# Patient Record
Sex: MALE | Race: WHITE | Employment: UNEMPLOYED | URBAN - METROPOLITAN AREA
[De-identification: names, ages, dates, MRNs, and addresses within clinical notes are randomized per-mention and may not be internally consistent; named-entity substitution may affect disease eponyms.]

---

## 2017-04-28 ENCOUNTER — TELEPHONE (OUTPATIENT)
Dept: INTERNAL MEDICINE CLINIC | Age: 27
End: 2017-04-28

## 2017-05-08 ENCOUNTER — OFFICE VISIT (OUTPATIENT)
Dept: INTERNAL MEDICINE CLINIC | Age: 27
End: 2017-05-08

## 2017-05-08 VITALS
HEART RATE: 64 BPM | BODY MASS INDEX: 24.4 KG/M2 | OXYGEN SATURATION: 98 % | HEIGHT: 66 IN | WEIGHT: 151.8 LBS | SYSTOLIC BLOOD PRESSURE: 118 MMHG | DIASTOLIC BLOOD PRESSURE: 78 MMHG

## 2017-05-08 DIAGNOSIS — F11.90 HEROIN USE: Primary | ICD-10-CM

## 2017-05-08 DIAGNOSIS — F41.9 ANXIETY: ICD-10-CM

## 2017-05-08 PROCEDURE — 99214 OFFICE O/P EST MOD 30 MIN: CPT | Performed by: NURSE PRACTITIONER

## 2017-05-15 ENCOUNTER — TELEPHONE (OUTPATIENT)
Dept: INTERNAL MEDICINE CLINIC | Age: 27
End: 2017-05-15

## 2018-11-11 ENCOUNTER — HOSPITAL ENCOUNTER (EMERGENCY)
Age: 28
Discharge: HOME | End: 2018-11-11
Attending: EMERGENCY MEDICINE
Payer: MEDICAID

## 2018-11-11 ENCOUNTER — APPOINTMENT (OUTPATIENT)
Dept: CT IMAGING | Age: 28
End: 2018-11-11
Payer: MEDICAID

## 2018-11-11 VITALS
OXYGEN SATURATION: 95 % | HEART RATE: 96 BPM | DIASTOLIC BLOOD PRESSURE: 79 MMHG | SYSTOLIC BLOOD PRESSURE: 120 MMHG | WEIGHT: 151 LBS | RESPIRATION RATE: 14 BRPM | TEMPERATURE: 98 F | BODY MASS INDEX: 24.37 KG/M2

## 2018-11-11 DIAGNOSIS — R07.81 RIB PAIN: ICD-10-CM

## 2018-11-11 DIAGNOSIS — J18.9 PNEUMONIA DUE TO ORGANISM: Primary | ICD-10-CM

## 2018-11-11 LAB
ALBUMIN SERPL-MCNC: 3.9 G/DL (ref 3.4–5)
ALP BLD-CCNC: 64 U/L (ref 40–129)
ALT SERPL-CCNC: 139 U/L (ref 10–40)
ANION GAP SERPL CALCULATED.3IONS-SCNC: 12 MMOL/L (ref 3–16)
AST SERPL-CCNC: 86 U/L (ref 15–37)
BASOPHILS ABSOLUTE: 0.1 K/UL (ref 0–0.2)
BASOPHILS RELATIVE PERCENT: 0.6 %
BILIRUB SERPL-MCNC: 0.6 MG/DL (ref 0–1)
BILIRUBIN DIRECT: <0.2 MG/DL (ref 0–0.3)
BILIRUBIN, INDIRECT: ABNORMAL MG/DL (ref 0–1)
BUN BLDV-MCNC: 6 MG/DL (ref 7–20)
CALCIUM SERPL-MCNC: 9.3 MG/DL (ref 8.3–10.6)
CHLORIDE BLD-SCNC: 94 MMOL/L (ref 99–110)
CO2: 28 MMOL/L (ref 21–32)
CREAT SERPL-MCNC: 0.6 MG/DL (ref 0.9–1.3)
EOSINOPHILS ABSOLUTE: 0 K/UL (ref 0–0.6)
EOSINOPHILS RELATIVE PERCENT: 0 %
GFR AFRICAN AMERICAN: >60
GFR NON-AFRICAN AMERICAN: >60
GLUCOSE BLD-MCNC: 216 MG/DL (ref 70–99)
HCT VFR BLD CALC: 37.1 % (ref 40.5–52.5)
HEMOGLOBIN: 12.6 G/DL (ref 13.5–17.5)
INR BLD: 1.46 (ref 0.86–1.14)
LYMPHOCYTES ABSOLUTE: 1.5 K/UL (ref 1–5.1)
LYMPHOCYTES RELATIVE PERCENT: 10.8 %
MCH RBC QN AUTO: 29.8 PG (ref 26–34)
MCHC RBC AUTO-ENTMCNC: 34.1 G/DL (ref 31–36)
MCV RBC AUTO: 87.4 FL (ref 80–100)
MONOCYTES ABSOLUTE: 0.9 K/UL (ref 0–1.3)
MONOCYTES RELATIVE PERCENT: 6.5 %
NEUTROPHILS ABSOLUTE: 11.5 K/UL (ref 1.7–7.7)
NEUTROPHILS RELATIVE PERCENT: 82.1 %
PDW BLD-RTO: 15 % (ref 12.4–15.4)
PLATELET # BLD: 200 K/UL (ref 135–450)
PMV BLD AUTO: 8.2 FL (ref 5–10.5)
POTASSIUM SERPL-SCNC: 3.6 MMOL/L (ref 3.5–5.1)
PROTHROMBIN TIME: 16.6 SEC (ref 9.8–13)
RBC # BLD: 4.24 M/UL (ref 4.2–5.9)
SODIUM BLD-SCNC: 134 MMOL/L (ref 136–145)
TOTAL PROTEIN: 7.2 G/DL (ref 6.4–8.2)
WBC # BLD: 14.1 K/UL (ref 4–11)

## 2018-11-11 PROCEDURE — 85025 COMPLETE CBC W/AUTO DIFF WBC: CPT

## 2018-11-11 PROCEDURE — 2580000003 HC RX 258: Performed by: EMERGENCY MEDICINE

## 2018-11-11 PROCEDURE — 6370000000 HC RX 637 (ALT 250 FOR IP): Performed by: EMERGENCY MEDICINE

## 2018-11-11 PROCEDURE — 99284 EMERGENCY DEPT VISIT MOD MDM: CPT

## 2018-11-11 PROCEDURE — 96374 THER/PROPH/DIAG INJ IV PUSH: CPT

## 2018-11-11 PROCEDURE — 85610 PROTHROMBIN TIME: CPT

## 2018-11-11 PROCEDURE — 74177 CT ABD & PELVIS W/CONTRAST: CPT

## 2018-11-11 PROCEDURE — 96361 HYDRATE IV INFUSION ADD-ON: CPT

## 2018-11-11 PROCEDURE — 6360000004 HC RX CONTRAST MEDICATION: Performed by: EMERGENCY MEDICINE

## 2018-11-11 PROCEDURE — 71260 CT THORAX DX C+: CPT

## 2018-11-11 PROCEDURE — 80048 BASIC METABOLIC PNL TOTAL CA: CPT

## 2018-11-11 PROCEDURE — 80076 HEPATIC FUNCTION PANEL: CPT

## 2018-11-11 PROCEDURE — 6360000002 HC RX W HCPCS: Performed by: EMERGENCY MEDICINE

## 2018-11-11 RX ORDER — 0.9 % SODIUM CHLORIDE 0.9 %
1000 INTRAVENOUS SOLUTION INTRAVENOUS ONCE
Status: COMPLETED | OUTPATIENT
Start: 2018-11-11 | End: 2018-11-11

## 2018-11-11 RX ORDER — DOXYCYCLINE 100 MG/1
100 TABLET ORAL 2 TIMES DAILY
Qty: 14 TABLET | Refills: 0 | Status: SHIPPED | OUTPATIENT
Start: 2018-11-11 | End: 2018-11-18

## 2018-11-11 RX ORDER — DOXYCYCLINE 100 MG/1
100 CAPSULE ORAL ONCE
Status: COMPLETED | OUTPATIENT
Start: 2018-11-11 | End: 2018-11-11

## 2018-11-11 RX ADMIN — SODIUM CHLORIDE 1000 ML: 9 INJECTION, SOLUTION INTRAVENOUS at 16:36

## 2018-11-11 RX ADMIN — HYDROMORPHONE HYDROCHLORIDE 0.5 MG: 1 INJECTION, SOLUTION INTRAMUSCULAR; INTRAVENOUS; SUBCUTANEOUS at 16:36

## 2018-11-11 RX ADMIN — IOPAMIDOL 80 ML: 755 INJECTION, SOLUTION INTRAVENOUS at 16:59

## 2018-11-11 RX ADMIN — DOXYCYCLINE 100 MG: 100 CAPSULE ORAL at 18:36

## 2018-11-11 ASSESSMENT — PAIN DESCRIPTION - LOCATION: LOCATION: RIB CAGE

## 2018-11-11 ASSESSMENT — PAIN SCALES - GENERAL: PAINLEVEL_OUTOF10: 10

## 2018-11-11 ASSESSMENT — PAIN DESCRIPTION - ORIENTATION: ORIENTATION: LEFT

## 2018-11-11 NOTE — ED PROVIDER NOTES
portal venous system. No adenopathy, mesenteric mass, ascites or free air. No abnormal bowel dilatation or wall thickening. Normal unopacified bladder. Normal size prostate. No fracture or dislocation. Benign bone islands proximal right femur and supra-acetabular left ilium. IMPRESSION:       No acute abnormality, mass or abnormal fluid collection. 10 mm small hypodense focus in left lobe of liver region of fissure of ligamentum teres consistent with a benign finding. Suggest follow-up CT abdomen with IV contrast in one year for confirmation of stability. LABS:   Labs Reviewed   CBC WITH AUTO DIFFERENTIAL - Abnormal; Notable for the following:        Result Value    WBC 14.1 (*)     Hemoglobin 12.6 (*)     Hematocrit 37.1 (*)     Neutrophils # 11.5 (*)     All other components within normal limits    Narrative:     Performed at: [facility]  [address],  [place], [address]   Phone [phone]   BASIC METABOLIC PANEL - Abnormal; Notable for the following:     Sodium 134 (*)     Chloride 94 (*)     Glucose 216 (*)     BUN 6 (*)     CREATININE 0.6 (*)     All other components within normal limits    Narrative:     Performed at: [facility]  [address],  [place], [address]   Phone [phone]   HEPATIC FUNCTION PANEL - Abnormal; Notable for the following:      (*)     AST 86 (*)     All other components within normal limits    Narrative:     Performed at: [facility]  [address],  [place], [address]   Phone [phone]   PROTIME-INR - Abnormal; Notable for the following:     Protime 16.6 (*)     INR 1.46 (*)     All other components within normal limits    Narrative:     Performed at:   [facility]  [address],  [place], [address]   Phone [phone]

## 2018-11-11 NOTE — ED PROVIDER NOTES
catch breath, or vomiting  Patient understands plan      Diagnostic Results       RADIOLOGY:  CT CHEST W CONTRAST   Final Result      Atelectasis and/or pneumonia anterior-inferior lingula and posterior inferior left lower lobe. Minimal left pleural effusion. No pneumothorax. Chronic fractures left eighth, ninth and 10th ribs. CT ABDOMEN PELVIS WITH CONTRAST      HISTORY: 78-year-old male with left chest trauma      Images from dome of liver through pubic symphysis without oral contrast and with the IV contrast from CT chest. No prior CTs for comparison. Radiation dose reduction individualized and optimized for the CT scan to reduce the radiation exposure to as low as reasonably achievable (ALARA), while still obtaining diagnotic-quality images. The dose reduction techniques include automated exposure    control, adjustment of mA and/or kV according to patient size and use of iterative reconstruction technique. 7 mm x 10 mm small mildly hypodense focus higher than fat density left lobe of liver in the region of fissure of ligamentum teres (axial series 2 images 105-106). Remainder of liver, spleen and pancreas normal.      Small contracted gallbladder. Normal caliber biliary tract. Normal kidneys and adrenals. No hydroureteronephrosis. Normal abdominal aorta and iliac arteries. Normal opacification of portal venous system. No adenopathy, mesenteric mass, ascites or free air. No abnormal bowel dilatation or wall thickening. Normal unopacified bladder. Normal size prostate. No fracture or dislocation. Benign bone islands proximal right femur and supra-acetabular left ilium. IMPRESSION:       No acute abnormality, mass or abnormal fluid collection. 10 mm small hypodense focus in left lobe of liver region of fissure of ligamentum teres consistent with a benign finding.    Suggest follow-up CT abdomen with IV contrast in one year for confirmation of stability. CT ABDOMEN PELVIS W IV CONTRAST Additional Contrast? None   Final Result      Atelectasis and/or pneumonia anterior-inferior lingula and posterior inferior left lower lobe. Minimal left pleural effusion. No pneumothorax. Chronic fractures left eighth, ninth and 10th ribs. CT ABDOMEN PELVIS WITH CONTRAST      HISTORY: 57-year-old male with left chest trauma      Images from dome of liver through pubic symphysis without oral contrast and with the IV contrast from CT chest. No prior CTs for comparison. Radiation dose reduction individualized and optimized for the CT scan to reduce the radiation exposure to as low as reasonably achievable (ALARA), while still obtaining diagnotic-quality images. The dose reduction techniques include automated exposure    control, adjustment of mA and/or kV according to patient size and use of iterative reconstruction technique. 7 mm x 10 mm small mildly hypodense focus higher than fat density left lobe of liver in the region of fissure of ligamentum teres (axial series 2 images 105-106). Remainder of liver, spleen and pancreas normal.      Small contracted gallbladder. Normal caliber biliary tract. Normal kidneys and adrenals. No hydroureteronephrosis. Normal abdominal aorta and iliac arteries. Normal opacification of portal venous system. No adenopathy, mesenteric mass, ascites or free air. No abnormal bowel dilatation or wall thickening. Normal unopacified bladder. Normal size prostate. No fracture or dislocation. Benign bone islands proximal right femur and supra-acetabular left ilium. IMPRESSION:       No acute abnormality, mass or abnormal fluid collection. 10 mm small hypodense focus in left lobe of liver region of fissure of ligamentum teres consistent with a benign finding. Suggest follow-up CT abdomen with IV contrast in one year for confirmation of stability.              LABS:   Labs

## 2019-02-25 ENCOUNTER — APPOINTMENT (OUTPATIENT)
Dept: GENERAL RADIOLOGY | Age: 29
End: 2019-02-25
Payer: MEDICAID

## 2019-02-25 ENCOUNTER — HOSPITAL ENCOUNTER (EMERGENCY)
Age: 29
Discharge: HOME | End: 2019-02-26
Attending: EMERGENCY MEDICINE
Payer: MEDICAID

## 2019-02-25 DIAGNOSIS — T40.1X4A DIACETYLMORPHINE OVERDOSE, UNDETERMINED INTENT, INITIAL ENCOUNTER: Primary | ICD-10-CM

## 2019-02-25 PROCEDURE — 71046 X-RAY EXAM CHEST 2 VIEWS: CPT

## 2019-02-25 PROCEDURE — 99284 EMERGENCY DEPT VISIT MOD MDM: CPT

## 2019-02-26 VITALS
TEMPERATURE: 97.7 F | SYSTOLIC BLOOD PRESSURE: 130 MMHG | OXYGEN SATURATION: 98 % | DIASTOLIC BLOOD PRESSURE: 87 MMHG | HEART RATE: 89 BPM

## 2019-08-18 ENCOUNTER — APPOINTMENT (OUTPATIENT)
Dept: CT IMAGING | Age: 29
DRG: 812 | End: 2019-08-18
Payer: MEDICAID

## 2019-08-18 ENCOUNTER — APPOINTMENT (OUTPATIENT)
Dept: GENERAL RADIOLOGY | Age: 29
DRG: 812 | End: 2019-08-18
Payer: MEDICAID

## 2019-08-18 ENCOUNTER — HOSPITAL ENCOUNTER (INPATIENT)
Age: 29
LOS: 2 days | Discharge: HOME | DRG: 812 | End: 2019-08-20
Attending: EMERGENCY MEDICINE | Admitting: INTERNAL MEDICINE
Payer: MEDICAID

## 2019-08-18 DIAGNOSIS — T50.901A ACCIDENTAL DRUG OVERDOSE, INITIAL ENCOUNTER: Primary | ICD-10-CM

## 2019-08-18 DIAGNOSIS — N17.9 AKI (ACUTE KIDNEY INJURY) (HCC): ICD-10-CM

## 2019-08-18 DIAGNOSIS — F19.921 ACUTE DRUG INTOXICATION WITH DELIRIUM (HCC): ICD-10-CM

## 2019-08-18 LAB
A/G RATIO: 1.4 (ref 1.1–2.2)
ALBUMIN SERPL-MCNC: 5.3 G/DL (ref 3.4–5)
ALP BLD-CCNC: 59 U/L (ref 40–129)
ALT SERPL-CCNC: 47 U/L (ref 10–40)
AMORPHOUS: ABNORMAL /HPF
AMPHETAMINE SCREEN, URINE: POSITIVE
ANION GAP SERPL CALCULATED.3IONS-SCNC: 22 MMOL/L (ref 3–16)
AST SERPL-CCNC: 113 U/L (ref 15–37)
BACTERIA: ABNORMAL /HPF
BANDED NEUTROPHILS RELATIVE PERCENT: 7 % (ref 0–7)
BARBITURATE SCREEN URINE: ABNORMAL
BASOPHILS ABSOLUTE: 0 K/UL (ref 0–0.2)
BASOPHILS RELATIVE PERCENT: 0 %
BENZODIAZEPINE SCREEN, URINE: ABNORMAL
BILIRUB SERPL-MCNC: 1.1 MG/DL (ref 0–1)
BILIRUBIN URINE: ABNORMAL
BLOOD, URINE: ABNORMAL
BUN BLDV-MCNC: 30 MG/DL (ref 7–20)
CALCIUM SERPL-MCNC: 10 MG/DL (ref 8.3–10.6)
CANNABINOID SCREEN URINE: POSITIVE
CASTS 2: ABNORMAL /LPF
CASTS: ABNORMAL /LPF
CHLORIDE BLD-SCNC: 100 MMOL/L (ref 99–110)
CLARITY: CLEAR
CO2: 20 MMOL/L (ref 21–32)
COCAINE METABOLITE SCREEN URINE: POSITIVE
COLOR: YELLOW
CREAT SERPL-MCNC: 1.8 MG/DL (ref 0.9–1.3)
EOSINOPHILS ABSOLUTE: 0.2 K/UL (ref 0–0.6)
EOSINOPHILS RELATIVE PERCENT: 1 %
EPITHELIAL CELLS, UA: ABNORMAL /HPF
ETHANOL: NORMAL MG/DL (ref 0–0.08)
GFR AFRICAN AMERICAN: 54
GFR NON-AFRICAN AMERICAN: 45
GLOBULIN: 3.7 G/DL
GLUCOSE BLD-MCNC: 124 MG/DL (ref 70–99)
GLUCOSE URINE: NEGATIVE MG/DL
HCT VFR BLD CALC: 42.5 % (ref 40.5–52.5)
HEMATOLOGY PATH CONSULT: YES
HEMOGLOBIN: 14.4 G/DL (ref 13.5–17.5)
KETONES, URINE: 15 MG/DL
LACTIC ACID: 1.1 MMOL/L (ref 0.4–2)
LEUKOCYTE ESTERASE, URINE: NEGATIVE
LYMPHOCYTES ABSOLUTE: 3.7 K/UL (ref 1–5.1)
LYMPHOCYTES RELATIVE PERCENT: 18 %
Lab: ABNORMAL
MAGNESIUM: 1.8 MG/DL (ref 1.8–2.4)
MCH RBC QN AUTO: 29.9 PG (ref 26–34)
MCHC RBC AUTO-ENTMCNC: 34 G/DL (ref 31–36)
MCV RBC AUTO: 88.1 FL (ref 80–100)
METHADONE SCREEN, URINE: ABNORMAL
MICROSCOPIC EXAMINATION: YES
MONOCYTES ABSOLUTE: 2.2 K/UL (ref 0–1.3)
MONOCYTES RELATIVE PERCENT: 11 %
NEUTROPHILS ABSOLUTE: 14.2 K/UL (ref 1.7–7.7)
NEUTROPHILS RELATIVE PERCENT: 63 %
NITRITE, URINE: NEGATIVE
OPIATE SCREEN URINE: POSITIVE
OXYCODONE URINE: ABNORMAL
PDW BLD-RTO: 12.7 % (ref 12.4–15.4)
PH UA: 5.5
PH UA: 5.5 (ref 5–8)
PHENCYCLIDINE SCREEN URINE: ABNORMAL
PLATELET # BLD: 287 K/UL (ref 135–450)
PMV BLD AUTO: 8.9 FL (ref 5–10.5)
POTASSIUM REFLEX MAGNESIUM: 3.5 MMOL/L (ref 3.5–5.1)
PROPOXYPHENE SCREEN: ABNORMAL
PROTEIN UA: 100 MG/DL
RBC # BLD: 4.82 M/UL (ref 4.2–5.9)
RBC UA: ABNORMAL /HPF (ref 0–2)
SODIUM BLD-SCNC: 142 MMOL/L (ref 136–145)
SPECIFIC GRAVITY UA: >=1.03 (ref 1–1.03)
TOTAL CK: 1669 U/L (ref 39–308)
TOTAL PROTEIN: 9 G/DL (ref 6.4–8.2)
TROPONIN: <0.01 NG/ML
URINE TYPE: ABNORMAL
UROBILINOGEN, URINE: 1 E.U./DL
WBC # BLD: 20.3 K/UL (ref 4–11)
WBC UA: ABNORMAL /HPF (ref 0–5)

## 2019-08-18 PROCEDURE — 96372 THER/PROPH/DIAG INJ SC/IM: CPT

## 2019-08-18 PROCEDURE — 81001 URINALYSIS AUTO W/SCOPE: CPT

## 2019-08-18 PROCEDURE — 99291 CRITICAL CARE FIRST HOUR: CPT

## 2019-08-18 PROCEDURE — 84484 ASSAY OF TROPONIN QUANT: CPT

## 2019-08-18 PROCEDURE — 71045 X-RAY EXAM CHEST 1 VIEW: CPT

## 2019-08-18 PROCEDURE — G0480 DRUG TEST DEF 1-7 CLASSES: HCPCS

## 2019-08-18 PROCEDURE — 82550 ASSAY OF CK (CPK): CPT

## 2019-08-18 PROCEDURE — 96361 HYDRATE IV INFUSION ADD-ON: CPT

## 2019-08-18 PROCEDURE — 83735 ASSAY OF MAGNESIUM: CPT

## 2019-08-18 PROCEDURE — 70450 CT HEAD/BRAIN W/O DYE: CPT

## 2019-08-18 PROCEDURE — 36415 COLL VENOUS BLD VENIPUNCTURE: CPT

## 2019-08-18 PROCEDURE — 6360000002 HC RX W HCPCS: Performed by: EMERGENCY MEDICINE

## 2019-08-18 PROCEDURE — 96375 TX/PRO/DX INJ NEW DRUG ADDON: CPT

## 2019-08-18 PROCEDURE — 85025 COMPLETE CBC W/AUTO DIFF WBC: CPT

## 2019-08-18 PROCEDURE — 2500000003 HC RX 250 WO HCPCS: Performed by: EMERGENCY MEDICINE

## 2019-08-18 PROCEDURE — 83605 ASSAY OF LACTIC ACID: CPT

## 2019-08-18 PROCEDURE — 80307 DRUG TEST PRSMV CHEM ANLYZR: CPT

## 2019-08-18 PROCEDURE — 2000000000 HC ICU R&B

## 2019-08-18 PROCEDURE — 2580000003 HC RX 258: Performed by: EMERGENCY MEDICINE

## 2019-08-18 PROCEDURE — 51702 INSERT TEMP BLADDER CATH: CPT

## 2019-08-18 PROCEDURE — 93005 ELECTROCARDIOGRAM TRACING: CPT | Performed by: EMERGENCY MEDICINE

## 2019-08-18 PROCEDURE — 80053 COMPREHEN METABOLIC PANEL: CPT

## 2019-08-18 RX ORDER — HALOPERIDOL 5 MG
5 TABLET ORAL ONCE
Status: DISCONTINUED | OUTPATIENT
Start: 2019-08-18 | End: 2019-08-18

## 2019-08-18 RX ORDER — KETAMINE HYDROCHLORIDE 50 MG/ML
400 INJECTION, SOLUTION, CONCENTRATE INTRAMUSCULAR; INTRAVENOUS ONCE
Status: COMPLETED | OUTPATIENT
Start: 2019-08-18 | End: 2019-08-18

## 2019-08-18 RX ORDER — 0.9 % SODIUM CHLORIDE 0.9 %
1000 INTRAVENOUS SOLUTION INTRAVENOUS ONCE
Status: COMPLETED | OUTPATIENT
Start: 2019-08-18 | End: 2019-08-18

## 2019-08-18 RX ORDER — KETAMINE HYDROCHLORIDE 50 MG/ML
100 INJECTION, SOLUTION, CONCENTRATE INTRAMUSCULAR; INTRAVENOUS ONCE
Status: COMPLETED | OUTPATIENT
Start: 2019-08-18 | End: 2019-08-19

## 2019-08-18 RX ORDER — DIPHENHYDRAMINE HYDROCHLORIDE 50 MG/ML
50 INJECTION INTRAMUSCULAR; INTRAVENOUS ONCE
Status: COMPLETED | OUTPATIENT
Start: 2019-08-18 | End: 2019-08-18

## 2019-08-18 RX ORDER — LORAZEPAM 2 MG/ML
2 INJECTION INTRAMUSCULAR ONCE
Status: COMPLETED | OUTPATIENT
Start: 2019-08-18 | End: 2019-08-18

## 2019-08-18 RX ORDER — HALOPERIDOL 5 MG/ML
5 INJECTION INTRAMUSCULAR ONCE
Status: COMPLETED | OUTPATIENT
Start: 2019-08-18 | End: 2019-08-18

## 2019-08-18 RX ORDER — SODIUM CHLORIDE, SODIUM LACTATE, POTASSIUM CHLORIDE, CALCIUM CHLORIDE 600; 310; 30; 20 MG/100ML; MG/100ML; MG/100ML; MG/100ML
1000 INJECTION, SOLUTION INTRAVENOUS CONTINUOUS
Status: DISCONTINUED | OUTPATIENT
Start: 2019-08-18 | End: 2019-08-19

## 2019-08-18 RX ADMIN — KETAMINE HYDROCHLORIDE 400 MG: 50 INJECTION, SOLUTION INTRAMUSCULAR; INTRAVENOUS at 21:37

## 2019-08-18 RX ADMIN — HALOPERIDOL LACTATE 5 MG: 5 INJECTION, SOLUTION INTRAMUSCULAR at 20:53

## 2019-08-18 RX ADMIN — LORAZEPAM 2 MG: 2 INJECTION INTRAMUSCULAR; INTRAVENOUS at 20:49

## 2019-08-18 RX ADMIN — DIPHENHYDRAMINE HYDROCHLORIDE 50 MG: 50 INJECTION, SOLUTION INTRAMUSCULAR; INTRAVENOUS at 20:49

## 2019-08-18 RX ADMIN — SODIUM CHLORIDE, SODIUM LACTATE, POTASSIUM CHLORIDE, AND CALCIUM CHLORIDE 1000 ML: .6; .31; .03; .02 INJECTION, SOLUTION INTRAVENOUS at 22:48

## 2019-08-18 RX ADMIN — SODIUM CHLORIDE 1000 ML: 9 INJECTION, SOLUTION INTRAVENOUS at 20:50

## 2019-08-19 ENCOUNTER — APPOINTMENT (OUTPATIENT)
Dept: GENERAL RADIOLOGY | Age: 29
DRG: 812 | End: 2019-08-19
Payer: MEDICAID

## 2019-08-19 LAB
ANION GAP SERPL CALCULATED.3IONS-SCNC: 14 MMOL/L (ref 3–16)
BASE EXCESS ARTERIAL: -1.9 MMOL/L (ref -3–3)
BUN BLDV-MCNC: 24 MG/DL (ref 7–20)
CALCIUM SERPL-MCNC: 8.7 MG/DL (ref 8.3–10.6)
CARBOXYHEMOGLOBIN ARTERIAL: 1.1 % (ref 0–1.5)
CHLORIDE BLD-SCNC: 103 MMOL/L (ref 99–110)
CO2: 21 MMOL/L (ref 21–32)
CREAT SERPL-MCNC: 1.1 MG/DL (ref 0.9–1.3)
EKG ATRIAL RATE: 92 BPM
EKG DIAGNOSIS: NORMAL
EKG P AXIS: 44 DEGREES
EKG P-R INTERVAL: 138 MS
EKG Q-T INTERVAL: 358 MS
EKG QRS DURATION: 96 MS
EKG QTC CALCULATION (BAZETT): 442 MS
EKG R AXIS: 2 DEGREES
EKG T AXIS: 28 DEGREES
EKG VENTRICULAR RATE: 92 BPM
GFR AFRICAN AMERICAN: >60
GFR NON-AFRICAN AMERICAN: >60
GLUCOSE BLD-MCNC: 97 MG/DL (ref 70–99)
HCO3 ARTERIAL: 23 MMOL/L (ref 21–29)
HCT VFR BLD CALC: 34.9 % (ref 40.5–52.5)
HEMATOLOGY PATH CONSULT: NORMAL
HEMOGLOBIN, ART, EXTENDED: 12 G/DL (ref 13.5–17.5)
HEMOGLOBIN: 12.2 G/DL (ref 13.5–17.5)
LACTIC ACID: 0.7 MMOL/L (ref 0.4–2)
LACTIC ACID: 0.9 MMOL/L (ref 0.4–2)
LACTIC ACID: 1.6 MMOL/L (ref 0.4–2)
MAGNESIUM: 2.2 MG/DL (ref 1.8–2.4)
MCH RBC QN AUTO: 30.8 PG (ref 26–34)
MCHC RBC AUTO-ENTMCNC: 34.9 G/DL (ref 31–36)
MCV RBC AUTO: 88.2 FL (ref 80–100)
METHEMOGLOBIN ARTERIAL: 0.9 %
O2 CONTENT ARTERIAL: 17 ML/DL
O2 SAT, ARTERIAL: 99.3 %
O2 THERAPY: ABNORMAL
PCO2 ARTERIAL: 42.5 MMHG (ref 35–45)
PDW BLD-RTO: 12.6 % (ref 12.4–15.4)
PH ARTERIAL: 7.35 (ref 7.35–7.45)
PHOSPHORUS: 3.2 MG/DL (ref 2.5–4.9)
PLATELET # BLD: 180 K/UL (ref 135–450)
PMV BLD AUTO: 8.5 FL (ref 5–10.5)
PO2 ARTERIAL: 202 MMHG (ref 75–108)
POTASSIUM SERPL-SCNC: 4.3 MMOL/L (ref 3.5–5.1)
PROCALCITONIN: 0.76 NG/ML (ref 0–0.15)
RBC # BLD: 3.96 M/UL (ref 4.2–5.9)
SODIUM BLD-SCNC: 138 MMOL/L (ref 136–145)
TCO2 ARTERIAL: 24.3 MMOL/L
TOTAL CK: 1711 U/L (ref 39–308)
WBC # BLD: 14.2 K/UL (ref 4–11)

## 2019-08-19 PROCEDURE — 84145 PROCALCITONIN (PCT): CPT

## 2019-08-19 PROCEDURE — 99255 IP/OBS CONSLTJ NEW/EST HI 80: CPT | Performed by: INTERNAL MEDICINE

## 2019-08-19 PROCEDURE — 6360000002 HC RX W HCPCS: Performed by: EMERGENCY MEDICINE

## 2019-08-19 PROCEDURE — 2580000003 HC RX 258: Performed by: INTERNAL MEDICINE

## 2019-08-19 PROCEDURE — 94002 VENT MGMT INPAT INIT DAY: CPT

## 2019-08-19 PROCEDURE — 83735 ASSAY OF MAGNESIUM: CPT

## 2019-08-19 PROCEDURE — 80048 BASIC METABOLIC PNL TOTAL CA: CPT

## 2019-08-19 PROCEDURE — 2500000003 HC RX 250 WO HCPCS: Performed by: EMERGENCY MEDICINE

## 2019-08-19 PROCEDURE — 6360000002 HC RX W HCPCS: Performed by: INTERNAL MEDICINE

## 2019-08-19 PROCEDURE — 94761 N-INVAS EAR/PLS OXIMETRY MLT: CPT

## 2019-08-19 PROCEDURE — 36415 COLL VENOUS BLD VENIPUNCTURE: CPT

## 2019-08-19 PROCEDURE — 71045 X-RAY EXAM CHEST 1 VIEW: CPT

## 2019-08-19 PROCEDURE — 93010 ELECTROCARDIOGRAM REPORT: CPT | Performed by: INTERNAL MEDICINE

## 2019-08-19 PROCEDURE — 36600 WITHDRAWAL OF ARTERIAL BLOOD: CPT

## 2019-08-19 PROCEDURE — APPNB15 APP NON BILLABLE TIME 0-15 MINS: Performed by: NURSE PRACTITIONER

## 2019-08-19 PROCEDURE — 84100 ASSAY OF PHOSPHORUS: CPT

## 2019-08-19 PROCEDURE — 85027 COMPLETE CBC AUTOMATED: CPT

## 2019-08-19 PROCEDURE — 94750 HC PULMONARY COMPLIANCE STUDY: CPT

## 2019-08-19 PROCEDURE — 82803 BLOOD GASES ANY COMBINATION: CPT

## 2019-08-19 PROCEDURE — 82550 ASSAY OF CK (CPK): CPT

## 2019-08-19 PROCEDURE — 96365 THER/PROPH/DIAG IV INF INIT: CPT

## 2019-08-19 PROCEDURE — 2700000000 HC OXYGEN THERAPY PER DAY

## 2019-08-19 PROCEDURE — 2000000000 HC ICU R&B

## 2019-08-19 PROCEDURE — 83605 ASSAY OF LACTIC ACID: CPT

## 2019-08-19 RX ORDER — ETOMIDATE 2 MG/ML
15 INJECTION INTRAVENOUS ONCE
Status: COMPLETED | OUTPATIENT
Start: 2019-08-19 | End: 2019-08-19

## 2019-08-19 RX ORDER — SODIUM CHLORIDE 0.9 % (FLUSH) 0.9 %
10 SYRINGE (ML) INJECTION PRN
Status: DISCONTINUED | OUTPATIENT
Start: 2019-08-19 | End: 2019-08-20 | Stop reason: HOSPADM

## 2019-08-19 RX ORDER — ROCURONIUM BROMIDE 10 MG/ML
100 INJECTION, SOLUTION INTRAVENOUS ONCE
Status: COMPLETED | OUTPATIENT
Start: 2019-08-19 | End: 2019-08-19

## 2019-08-19 RX ORDER — ACETAMINOPHEN 325 MG/1
650 TABLET ORAL EVERY 4 HOURS PRN
Status: DISCONTINUED | OUTPATIENT
Start: 2019-08-19 | End: 2019-08-20 | Stop reason: HOSPADM

## 2019-08-19 RX ORDER — PROPOFOL 10 MG/ML
INJECTION, EMULSION INTRAVENOUS
Status: DISCONTINUED
Start: 2019-08-19 | End: 2019-08-19

## 2019-08-19 RX ORDER — PROPOFOL 10 MG/ML
10 INJECTION, EMULSION INTRAVENOUS ONCE
Status: COMPLETED | OUTPATIENT
Start: 2019-08-19 | End: 2019-08-19

## 2019-08-19 RX ORDER — SODIUM CHLORIDE 0.9 % (FLUSH) 0.9 %
10 SYRINGE (ML) INJECTION EVERY 12 HOURS SCHEDULED
Status: DISCONTINUED | OUTPATIENT
Start: 2019-08-19 | End: 2019-08-20 | Stop reason: HOSPADM

## 2019-08-19 RX ORDER — PROPOFOL 10 MG/ML
10 INJECTION, EMULSION INTRAVENOUS
Status: DISCONTINUED | OUTPATIENT
Start: 2019-08-19 | End: 2019-08-19

## 2019-08-19 RX ORDER — POTASSIUM CHLORIDE 7.45 MG/ML
10 INJECTION INTRAVENOUS PRN
Status: DISCONTINUED | OUTPATIENT
Start: 2019-08-19 | End: 2019-08-20 | Stop reason: HOSPADM

## 2019-08-19 RX ORDER — MAGNESIUM SULFATE 1 G/100ML
1 INJECTION INTRAVENOUS PRN
Status: DISCONTINUED | OUTPATIENT
Start: 2019-08-19 | End: 2019-08-20 | Stop reason: HOSPADM

## 2019-08-19 RX ORDER — ONDANSETRON 2 MG/ML
4 INJECTION INTRAMUSCULAR; INTRAVENOUS EVERY 6 HOURS PRN
Status: DISCONTINUED | OUTPATIENT
Start: 2019-08-19 | End: 2019-08-20 | Stop reason: HOSPADM

## 2019-08-19 RX ORDER — SODIUM CHLORIDE 9 MG/ML
INJECTION, SOLUTION INTRAVENOUS CONTINUOUS
Status: DISCONTINUED | OUTPATIENT
Start: 2019-08-19 | End: 2019-08-20

## 2019-08-19 RX ADMIN — ROCURONIUM BROMIDE 100 MG: 10 INJECTION, SOLUTION INTRAVENOUS at 00:57

## 2019-08-19 RX ADMIN — SODIUM CHLORIDE: 9 INJECTION, SOLUTION INTRAVENOUS at 14:54

## 2019-08-19 RX ADMIN — PROPOFOL 50 MCG/KG/MIN: 10 INJECTION, EMULSION INTRAVENOUS at 05:48

## 2019-08-19 RX ADMIN — PROPOFOL 10 MCG/KG/MIN: 10 INJECTION, EMULSION INTRAVENOUS at 01:00

## 2019-08-19 RX ADMIN — PROPOFOL 50 MCG/KG/MIN: 10 INJECTION, EMULSION INTRAVENOUS at 02:25

## 2019-08-19 RX ADMIN — CEFTRIAXONE 1 G: 1 INJECTION, POWDER, FOR SOLUTION INTRAMUSCULAR; INTRAVENOUS at 04:35

## 2019-08-19 RX ADMIN — ETOMIDATE 15 MG: 2 INJECTION, SOLUTION INTRAVENOUS at 00:56

## 2019-08-19 RX ADMIN — SODIUM CHLORIDE, PRESERVATIVE FREE 10 ML: 5 INJECTION INTRAVENOUS at 20:53

## 2019-08-19 RX ADMIN — SODIUM CHLORIDE: 9 INJECTION, SOLUTION INTRAVENOUS at 02:36

## 2019-08-19 RX ADMIN — ENOXAPARIN SODIUM 40 MG: 40 INJECTION SUBCUTANEOUS at 11:24

## 2019-08-19 RX ADMIN — KETAMINE HYDROCHLORIDE 100 MG: 50 INJECTION INTRAMUSCULAR; INTRAVENOUS at 00:15

## 2019-08-19 RX ADMIN — SODIUM CHLORIDE: 9 INJECTION, SOLUTION INTRAVENOUS at 21:18

## 2019-08-19 ASSESSMENT — PULMONARY FUNCTION TESTS
PIF_VALUE: 14
PIF_VALUE: 15
PIF_VALUE: 11
PIF_VALUE: 22
PIF_VALUE: 17
PIF_VALUE: 15
PIF_VALUE: 16
PIF_VALUE: 14
PIF_VALUE: 17

## 2019-08-19 ASSESSMENT — PAIN SCALES - GENERAL
PAINLEVEL_OUTOF10: 0

## 2019-08-19 NOTE — ED NOTES
Pt iv infused went to hang another liter of ringers lactate . Pt sat up on the stretcher had blank stare started thrashing in the bed and making gutteral noises.  The  and 3 nurses at bedside to hold the pt down  Dr requested ketamine to be given     Michell Potter RN  08/19/19 9831

## 2019-08-19 NOTE — H&P
Hospital Medicine History & Physical      PCP: No primary care provider on file. Date of Admission: 8/18/2019    Date of Service: Pt seen/examined on 8/19/19 and Admitted to Inpatient. Chief Complaint:  AMS      History Of Present Illness:  History obtained from ER physician and medical records as pt. Is intubated and sedated   The patient is a 33 y.o. male who presents to [facility] after being brought to er by police for acting irrational in public place pt. Admits to poly substance abuse and was agitated pt. Received ketamine for sedation and was eventually intubated and sedated with propofol     Past Medical History:        Diagnosis Date    ADHD (attention deficit hyperactivity disorder)        Past Surgical History:    History reviewed. No pertinent surgical history. Medications Prior to Admission:    Prior to Admission medications    Medication Sig Start Date End Date Taking? Authorizing Provider   amphetamine-dextroamphetamine (ADDERALL, 20MG,) 20 MG tablet Take 1 tablet by mouth 2 times daily 7/13/16   Lily Tiwari DO       Allergies: Wellbutrin [bupropion] and Prednisone    Social History:  The patient currently lives unknown     TOBACCO:   reports that he has been smoking. He has been smoking about 0.50 packs per day. He has never used smokeless tobacco.  ETOH:   reports that he does not drink alcohol. Family History:  Reviewed in detail and negative for DM, Early CAD, Cancer, CVA. Positive as follows:    History reviewed. No pertinent family history. REVIEW OF SYSTEMS:   Unable to obtain due to pt.  Factors     PHYSICAL EXAM:    BP (!) 107/54   Pulse 63   Temp 97.7 °F (36.5 °C) (Axillary)   Resp 18   Ht 5' 7\" (1.702 m)   Wt 134 lb 4.2 oz (60.9 kg)   SpO2 100%   BMI 21.03 kg/m²     General appearance: No apparent distress

## 2019-08-19 NOTE — ED NOTES
Portable chest x ray completed  To assure placement of et tube. Attempted placement of salem without success.    Pt placed on transport vent per md. Settings tv 500 5 peep  Rate 20 50%[id number] for transport to take pt to [facility] [place] [facility]\""  08/19/19 3073

## 2019-08-19 NOTE — ED NOTES
Pt has increase redness noted to abd and over both knees  And rash above pubic area     Marcia Patel, CHAD  08/18/19 1366

## 2019-08-19 NOTE — PROGRESS NOTES
Sx small clear  Extubated without trauma   Placed on 3 lpm n/c  No distress   No stridor  Rn and security in room   0806

## 2019-08-19 NOTE — PROGRESS NOTES
4 Eyes Skin Assessment     The patient is being assess for  Shift Handoff    I agree that 2 RN's have performed a thorough Head to Toe Skin Assessment on the patient. ALL assessment sites listed below have been assessed. Areas assessed by both nurses:   [x]   Head, Face, and Ears   [x]   Shoulders, Back, and Chest  [x]   Arms, Elbows, and Hands   [x]   Coccyx, Sacrum, and IschIum  [x]   Legs, Feet, and Heels        Does the Patient have Skin Breakdown?   Yes LDA WOUND CARE was Initiated documentation include the Abigail-wound, Wound Assessment, Measurements, Dressing Treatment, Drainage, and Color\",         Maikel Prevention initiated:  Yes   Wound Care Orders initiated:  No      WOC nurse consulted for Pressure Injury (Stage 3,4, Unstageable, DTI, NWPT, and Complex wounds), New and Established Ostomies:  No      Nurse 1 eSignature: Electronically signed by Floridalma Rosales RN on 8/19/19 at 7:27 PM    **SHARE this note so that the co-signing nurse is able to place an eSignature**    Nurse 2 eSignature: Electronically signed by Angela Hernandez RN on 8/20/19 at 2:34 AM

## 2019-08-19 NOTE — PROGRESS NOTES
0700: Code violet called. Pt attempting to sit up, fighting staff, trying to pull ETT out. Propofol increased to 50mcg/min. Pt in leather restraints. Dr. Nel Palafox informed. 0715: Pt sedated. SBT initiated for extubation around 0800 per Dr. Nel Palafox. 0800: Called pt's mother to update. She will not be able to come pick him up if he is discharged or if he leaves AMA.   4145: Sedation turned off. Pt already awake and thrashing in bed. Security at bedside. 4937: Pt extubated. Following commands. Drowsy. Placed on 3L nasal canula. Remains in restraints. When asked if he knew where he was, he stated \"at the hospital.\"  [phone]: Critical care team rounding on pt. Awakens to voice but falls back asleep quickly. 0945: Restraints removed. Pt calm and cooperative. Breakfast tray ordered. Pt asked \"is it ok if I go back to sleep for a little while. \" Instructed to stay in bed. Call light within reach. 1045: Dr. Nicolas Pena at bedside. No new orders. Planning for discharge tomorrow. 1600: Assessment unchanged. Pt asleep. 1645: Pt awake and eating dinner. Grossly alert and oriented. Calm and cooperative. States that he is unsure where to go if he is discharged tomorrow but has been staying with a friend recently. 1800: Pt call light in reach. Instructed to call if he needs to get up. VSS.    1915: Handoff report complete with Galileo Mchugh

## 2019-08-19 NOTE — ED PROVIDER NOTES
exchange. ABDOMEN: Soft. Non-distended. Non-tender. No masses. No organomegaly. No guarding or rebound. EXTREMITIES: No peripheral edema. Moves all extremities equally. All extremities neurovascularly intact. SKIN: Warm and dry. Truncal diffuse erythema  NEUROLOGICAL: Alert and oriented to self and location, somewhat anxious and agitated but cooperative. Strength 5/5, sensation intact. Gait normal.   PSYCHIATRIC: Anxious mood and affect. No HI or SI expressed to me. RADIOLOGY    See below     EKG:     See below      ED COURSE/MDM        ED Course as of Aug 19 0111   Sun Aug 18, 2019   2054 Patient with agitated delirium, ill-appearing on arrival given Haldol, Ativan, Benadryl for sedation    [WL]   2103 Patient with continued severe agitation, worsening ability to cooperate, is an imminent threat to himself and others in the department, will add ketamine for sedation and continue evaluation    [WL]   2126 Comprehensive Metabolic Panel w/ Reflex to MG(!):    Sodium 142   Potassium 3.5   Chloride 100   CO2 20(!)   Anion Gap 22(!)   Glucose 124(!)   BUN 30(!)   Creatinine 1.8(!)   GFR Non- 45(!)   GFR  54(!)   Calcium 10.0   Total Protein 9.0(!)   Albumin 5.3(!)   Albumin/Globulin Ratio 1.4   Bilirubin 1.1(!)   Alk Phos 59   ALT 47(!)   (!)   Globulin 3.7 [WL]   2127 CBC Auto Differential(!):    WBC 20.3(!)   RBC 4.82   Hemoglobin Quant 14.4   Hematocrit 42.5   MCV 88.1   MCH 29.9   MCHC 34.0   RDW 12.7   Platelet Count 846   MPV 8.9 [WL]   2127 Troponin:    Troponin <0.01 [WL]   2136 Patient with severe agitated delirium with acute kidney injury and acidosis. Required repeat sedation, will admit for further stabilization, continue IV fluids, Place Mak    [WL]   2152 Sinus rhythm at 92. Normal axis. . No acute ST-T changes.    EKG 12 Lead [WL]   2152 Troponin:    Troponin <0.01 [WL]   2152 Magnesium:    Magnesium 1.80 [WL]   2152 Lactic Acid, Plasma: Lactic Acid 1.1 [WL]   2224 CK(!):    Total CK 1,669(!) [WL]   2225 Patient with evidence of rhabdomyolysis, will give continuous IV fluids    [WL]   2255 Urine Drug Screen(!):    Amphetamine Screen, Urine POSITIVE(!)   Barbiturate Screen, Ur Neg   Benzodiazepine Screen, Urine Neg   Cannabinoid Scrn, Ur POSITIVE(!)   Cocaine Metabolite Screen, Urine POSITIVE(!)   Opiate Scrn, Ur POSITIVE(!)   PCP Screen, Urine Neg   Methadone Screen, Urine Neg   Propoxyphene Scrn, Ur Neg   Oxycodone Urine Neg   pH, UA 5.5   Drug Screen Comment: see below [WL]   2255 Microscopic Urinalysis(!):    Casts 1-3 Hyaline(!)   CASTS 2 3-5 Fine Laurina Grew. (!)   WBC, UA 0-2   RBC, UA 10-20(!)   Epi Cells 0-2   Bacteria, UA 2+(!)   Amorphous, UA Rare(!) [WL]   Mon Aug 19, 2019   0031 With repeat severe agitation, not orientable prior to transport. We sedated and will intubate    [WL]   0056 Procedure Note - Intubation:  Emergent consent implied. Pre-oxygenation was administered and the appropriate equipment and staff were made available at the bedside. Alivia Frazier was sedated/paralyzed; please see the chart for the drugs and dosages administered. A Emir 4 laryngoscope blade was used for a grade 2 view and a 8.0mm endotracheal tube was viewed to pass through the cords on the first attempt. The tube was secured at 21cm to the lip. Tracheal position was confirmed using a colorimetric end-tidal CO2 detector, tube condensation and chest auscultation/visualization. Respiratory therapy is at the bedside and is assisting with ventilatory management.     [WL]   1042 No complications with intubation, endotracheal tube in place confirmed with chest x-ray. Transport arrived. Patient still sedated, receiving propofol drip, vital signs stable prior to transport    [WL]      ED Course User Index  [WL] DO Ruben Joel records were reviewed when applicable.  The ED course and plan were reviewed and results discussed with the

## 2019-08-19 NOTE — CONSULTS
REASON FOR CONSULTATION/CC: drug overdose, respiratory failure      Consult at request of Anjali Boswell MD     PCP: No primary care provider on file. Established Pulmonologist:  None    Chief Complaint   Patient presents with    Drug Overdose     pt found by police  acting irrational in public pt states took pre workout drink and a beer. denies doing drugs       HISTORY OF PRESENT ILLNESS: Carine Gunter is a 33y.o. year old male with a history of polysubstance drug abuse who presents with acute mental status changes, agitation due to polysubstance drug overdose. Patient was found in a neighbor's yard and sent to the hospital.  Patient was extremely agitated, described as \"psychotic\" was given PRN Haldol and ketamine which led to subsequent intubation for airway protection. UDS obtained positive for amphetamines, marijuana, cocaine, and opiates. Overnight patient required large doses of propofol and other sedatives to maintain his safety and security due to severe agitation. Is unable to provide history at this morning but is on his spontaneous breathing trial with plan for extubation. He arouses to voice, follows some simple one-step commands, but is not conversant. He appears somnolent. Past Medical History:   Diagnosis Date    ADHD (attention deficit hyperactivity disorder)          History reviewed. No pertinent surgical history. Family Hx  family history is not on file. Unable to obtain due to encephalopathy  Social Hx   reports that he has been smoking. He has been smoking about 0.50 packs per day.  He has never used smokeless tobacco.    Scheduled Meds:   propofol        sodium chloride flush  10 mL Intravenous 2 times per day    enoxaparin  40 mg Subcutaneous Daily    cefTRIAXone (ROCEPHIN) IV  1 g Intravenous Q24H       Continuous Infusions:   propofol Stopped (08/19/19 0808)    sodium chloride 125 mL/hr at 08/19/19 0236    lactated ringers Stopped (08/19/19 0226) PRN Meds:  sodium chloride flush, magnesium hydroxide, ondansetron, potassium chloride, magnesium sulfate, acetaminophen    ALLERGIES:  Patient is allergic to wellbutrin [bupropion] and prednisone. REVIEW OF SYSTEMS:  Unable to obtain due to encephalopathy    Objective:   PHYSICAL EXAM:  Blood pressure (!) 120/53, pulse 87, temperature 97.5 °F (36.4 °C), temperature source Axillary, resp. rate 18, height 5' 7\" (1.702 m), weight 134 lb 4.2 oz (60.9 kg), SpO2 97 %.'  Gen:  No acute distress. Peers comfortable  Eyes: PERRL. Anicteric sclera. No conjunctival injection. ENT: No discharge. Posterior oropharynx clear. External appearance of ears and nose normal.  Neck: Trachea midline. No mass   Resp:  No crackles. Faint inspiratory wheezes. No rhonchi. No dullness on percussion. CV: Regular rate. Regular rhythm. No murmur or rub. No edema. GI: Soft, Non-tender. Non-distended. +BS  Skin: Warm, dry, w/o erythema. Lymph: No cervical or supraclavicular LAD. M/S: No cyanosis. No clubbing. Neuro:   Somnolent, responds to voice. Unintelligible sounds. no focal neurologic deficit. Moves all extremities      Data Reviewed:   LABS:  CBC:   Recent Labs     08/18/19 2050 08/19/19 0429   WBC 20.3* 14.2*   HGB 14.4 12.2*   HCT 42.5 34.9*   MCV 88.1 88.2    180     BMP:   Recent Labs     08/18/19 2050 08/19/19 0429    138   K 3.5 4.3    103   CO2 20* 21   PHOS  --  3.2   BUN 30* 24*   CREATININE 1.8* 1.1     LIVER PROFILE:   Recent Labs     08/18/19 2050   *   ALT 47*   BILITOT 1.1*   ALKPHOS 59     PT/INR: No results for input(s): PROTIME, INR in the last 72 hours. APTT: No results for input(s): APTT in the last 72 hours.   UA:  Recent Labs     08/18/19  2200   COLORU Yellow   PHUR 5.5  5.5   LABCAST 1-3 Hyaline*   WBCUA 0-2   RBCUA 10-20*   BACTERIA 2+*   CLARITYU Clear   SPECGRAV >=1.030   LEUKOCYTESUR Negative   UROBILINOGEN 1.0   BILIRUBINUR SMALL*   BLOODU LARGE*   GLUCOSEU NAKIA BLANCA  Time Out: 00:03  Exam(s): FILM CXR 2 VIEWS      EXAM:    XR Chest, 2 Views     CLINICAL HISTORY:     Reason for exam: heroin OD - recieved bystander CPR. Reason for exam:-  >heroin OD - recieved bystander CPR. TECHNIQUE:    Frontal and lateral views of the chest.     COMPARISON:    No relevant prior studies available. FINDINGS:    Lungs:  Unremarkable. No consolidation. Pleural space:  Unremarkable. No pneumothorax. Heart:  Unremarkable. No cardiomegaly. Mediastinum:  Unremarkable. Bones/joints:  No acute fracture. Impression   No acute findings. CXR portable:   Results for orders placed during the hospital encounter of 08/18/19   XR CHEST PORTABLE    Narrative EXAMINATION:  ONE XRAY VIEW OF THE CHEST    8/19/2019 4:46 am    COMPARISON:  Chest radiograph earlier same date and priors. HISTORY:  ORDERING SYSTEM PROVIDED HISTORY: tube placement transfer from [facility]  TECHNOLOGIST PROVIDED HISTORY:  Reason for exam:->tube placement transfer from [facility]  Reason for Exam: tube placement transfer from [facility]  Type of Exam: Subsequent/Follow-up    FINDINGS:  Endotracheal tube is again seen in the upper thoracic trachea. The tip of  the left lateral costophrenic angle is excluded. The lung volumes are low. There is mild linear airspace disease in the retrocardiac left lung base. Lungs otherwise clear. No pneumothorax or pleural effusion. Cardiac and  mediastinal contours are without acute process. No acute osseous abnormality. Impression Stable appearance of the endotracheal tube. Mild linear airspace disease in the left base, likely atelectasis.          Access  Arterial       PICC           CVC               Assessment:     Acute Respiratory failure requiring intubation  Acute toxic encephalopathy  Polysubstance abuse  Unintentional Drug Overdose  Elevated CK    Plan:      -Wean supplemental oxygen to goal saturation of >90%  -Passed SBT, following simple

## 2019-08-20 VITALS
SYSTOLIC BLOOD PRESSURE: 113 MMHG | WEIGHT: 182.32 LBS | DIASTOLIC BLOOD PRESSURE: 55 MMHG | BODY MASS INDEX: 28.62 KG/M2 | HEART RATE: 67 BPM | HEIGHT: 67 IN | TEMPERATURE: 98 F | OXYGEN SATURATION: 100 % | RESPIRATION RATE: 22 BRPM

## 2019-08-20 LAB
ANION GAP SERPL CALCULATED.3IONS-SCNC: 10 MMOL/L (ref 3–16)
BUN BLDV-MCNC: 14 MG/DL (ref 7–20)
CALCIUM SERPL-MCNC: 8.2 MG/DL (ref 8.3–10.6)
CHLORIDE BLD-SCNC: 106 MMOL/L (ref 99–110)
CO2: 23 MMOL/L (ref 21–32)
CREAT SERPL-MCNC: 0.7 MG/DL (ref 0.9–1.3)
GFR AFRICAN AMERICAN: >60
GFR NON-AFRICAN AMERICAN: >60
GLUCOSE BLD-MCNC: 91 MG/DL (ref 70–99)
MAGNESIUM: 2 MG/DL (ref 1.8–2.4)
PHOSPHORUS: 1.5 MG/DL (ref 2.5–4.9)
POTASSIUM SERPL-SCNC: 3.5 MMOL/L (ref 3.5–5.1)
SODIUM BLD-SCNC: 139 MMOL/L (ref 136–145)

## 2019-08-20 PROCEDURE — 2580000003 HC RX 258: Performed by: INTERNAL MEDICINE

## 2019-08-20 PROCEDURE — 83735 ASSAY OF MAGNESIUM: CPT

## 2019-08-20 PROCEDURE — 2500000003 HC RX 250 WO HCPCS: Performed by: INTERNAL MEDICINE

## 2019-08-20 PROCEDURE — 36415 COLL VENOUS BLD VENIPUNCTURE: CPT

## 2019-08-20 PROCEDURE — 80048 BASIC METABOLIC PNL TOTAL CA: CPT

## 2019-08-20 PROCEDURE — 6360000002 HC RX W HCPCS: Performed by: INTERNAL MEDICINE

## 2019-08-20 PROCEDURE — 84100 ASSAY OF PHOSPHORUS: CPT

## 2019-08-20 PROCEDURE — 94761 N-INVAS EAR/PLS OXIMETRY MLT: CPT

## 2019-08-20 PROCEDURE — 6370000000 HC RX 637 (ALT 250 FOR IP): Performed by: INTERNAL MEDICINE

## 2019-08-20 RX ORDER — LORAZEPAM 1 MG/1
1 TABLET ORAL EVERY 6 HOURS PRN
Status: DISCONTINUED | OUTPATIENT
Start: 2019-08-20 | End: 2019-08-20 | Stop reason: HOSPADM

## 2019-08-20 RX ORDER — MAGNESIUM SULFATE IN WATER 40 MG/ML
2 INJECTION, SOLUTION INTRAVENOUS ONCE
Status: COMPLETED | OUTPATIENT
Start: 2019-08-20 | End: 2019-08-20

## 2019-08-20 RX ADMIN — CEFTRIAXONE 1 G: 1 INJECTION, POWDER, FOR SOLUTION INTRAMUSCULAR; INTRAVENOUS at 03:15

## 2019-08-20 RX ADMIN — LORAZEPAM 1 MG: 1 TABLET ORAL at 10:33

## 2019-08-20 RX ADMIN — ENOXAPARIN SODIUM 40 MG: 40 INJECTION SUBCUTANEOUS at 10:28

## 2019-08-20 RX ADMIN — SODIUM PHOSPHATE, MONOBASIC, MONOHYDRATE 26.46 MMOL: 276; 142 INJECTION, SOLUTION INTRAVENOUS at 10:29

## 2019-08-20 RX ADMIN — MAGNESIUM SULFATE HEPTAHYDRATE 2 G: 40 INJECTION, SOLUTION INTRAVENOUS at 10:29

## 2019-08-20 RX ADMIN — SODIUM CHLORIDE: 9 INJECTION, SOLUTION INTRAVENOUS at 04:46

## 2019-08-20 RX ADMIN — SODIUM CHLORIDE, PRESERVATIVE FREE 10 ML: 5 INJECTION INTRAVENOUS at 10:34

## 2019-08-20 ASSESSMENT — PAIN SCALES - GENERAL: PAINLEVEL_OUTOF10: 0

## 2019-08-20 NOTE — DISCHARGE SUMMARY
tolerated and no driving for today    Diet: regular diet          Labs: For convenience and continuity at follow-up the following most recent labs are provided:    CBC:   Lab Results   Component Value Date    WBC 14.2 08/19/2019    HGB 12.2 08/19/2019    HCT 34.9 08/19/2019     08/19/2019       RENAL:   Lab Results   Component Value Date     08/20/2019    K 3.5 08/20/2019    K 3.5 08/18/2019     08/20/2019    CO2 23 08/20/2019    BUN 14 08/20/2019    CREATININE 0.7 08/20/2019           Discharge Medications:    Aquilino Clinton   Home Medication Instructions OUB:[id number]    Printed on:08/20/19 9681   Medication Information                      amphetamine-dextroamphetamine (ADDERALL, 20MG,) 20 MG tablet  Take 1 tablet by mouth 2 times daily                 No future appointments. Time Spent on discharge is more than 45 minutes in the examination, evaluation, counseling and review of medications and discharge plan. Signed:  Malika Williamson MD   8/20/2019    The note was completed using EMR. Every effort was made to ensure accuracy; however, inadvertent computerized transcription errors may be present. Thank you No primary care provider on file. for the opportunity to be involved in this patient's care. If you have any questions or concerns please feel free to contact me at [phone].

## 2019-08-20 NOTE — DISCHARGE INSTR - COC
Continuity of Care Form    Patient Name: Karen Woody   :  1990  MRN:  [id number]    Admit date:  2019  Discharge date:  ***    Code Status Order: Full Code   Advance Directives:   Advance Care Flowsheet Documentation     Date/Time Healthcare Directive Type of Healthcare Directive Copy in [address] Agent's Name Healthcare Agent's Phone Number    [phone]  No, patient does not have an advance directive for healthcare treatment -- -- -- -- --          Admitting Physician:  Jewel Holman MD  PCP: No primary care provider on file. Discharging Nurse: [facility] Unit/Room#: A0Q-9756/2107-01  Discharging Unit Phone Number: ***    Emergency Contact:   Extended Emergency Contact Information  Primary Emergency Contact: [address] Phone: [phone]  Mobile Phone: [phone]-  Relation: Other    Past Surgical History:  History reviewed. No pertinent surgical history.     Immunization History:   Immunization History   Administered Date(s) Administered    DTaP 1989, 1990, 04/10/1990, 1992, 1994    Hepatitis B 2006, 05/15/2008    Hib, unspecified 1990    MMR 1990, 1995    Meningococcal MCV4P (Menactra) 05/15/2008    Polio IPV (IPOL) 1989, 1990, 04/10/1990, 1992    Td, unspecified formulation 1995    Tdap (Boostrix, Adacel) 2006    Varicella (Varivax) 05/15/2008       Active Problems:  Patient Active Problem List   Diagnosis Code    Adjustment disorder F43.20    Penile lesion N48.9    Nevus D22.9    Tobacco use Z72.0    ADD (attention deficit disorder) without hyperactivity F98.8    Hx of bipolar disorder Z86.59    Insomnia due to drug ([address] H57.364    Adjustment disorder with anxiety F43.22    Accidental drug overdose T50.901A    Acute drug intoxication with delirium ([address] F19.921    SOFÍA (acute kidney injury) ([address] N17.9       Isolation/Infection:   Isolation          No Isolation            Nurse Assessment:  Last Vital Signs: BP (!) 116/57   Pulse 81   Temp 98 °F (36.7 °C) (Oral)   Resp 22   Ht 5' 7\" (1.702 m)   Wt 182 lb 5.1 oz (82.7 kg)   SpO2 100%   BMI 28.56 kg/m²     Last documented pain score (0-10 scale): Pain Level: 0  Last Weight:   Wt Readings from Last 1 Encounters:   08/20/19 182 lb 5.1 oz (82.7 kg)     Mental Status:  {IP PT MENTAL STATUS:09466}    IV Access:  { HAL IV ACCESS:191190824}    Nursing Mobility/ADLs:  Walking   {[facility] DME NKTA:170163463}  Transfer  {[facility] DME HQNO:845155634}  Bathing  {[facility] DME GBSZ:367765204}  Dressing  {[facility] DME UWFW:795370757}  Toileting  {[facility] DME YMCD:541656406}  Feeding  {[facility] DME YKUO:053321017}  Med Admin  {[facility] DME WLAP:456255300}  Med Delivery   { HAL MED Delivery:674661941}    Wound Care Documentation and Therapy:  Wound 08/19/19 Toe (Comment  which one) Anterior; Left left great toe . 1 x .3 (Active)   Wound Traumatic 8/19/2019  4:00 PM   [facility]Wound Bed 100 8/19/2019  4:00 PM   Number of days: 1       Wound 08/19/19 Toe (Comment  which one) Anterior;Right right great toe 1.5 x 1.3 x .2 (Active)   Wound Traumatic 8/19/2019  4:00 PM   [facility]ound [facility] 100 8/19/2019  4:00 PM   Number of days: 1       Wound 08/19/19 Toe (Comment  which one) left 4th toe closed blister (Active)   Number of days: 1       Wound 08/19/19 Toe (Comment  which one) left 5th toe  1.5 x1 (Active)   Wound Traumatic 8/19/2019  4:00 PM   Wound Assessment Drainage; Red 8/19/2019  4:00 PM   [facility]Wound Bed 25 8/19/2019  4:00 PM   [facility]Wound Bed 75 8/19/2019  4:00 PM   Number of days: 1       Wound 08/19/19 Toe (Comment  which one) Right right  3rd . 5 x .5 (Active)   Wound Traumatic 8/19/2019  4:00 PM   [facility]ound Bed 100 8/19/2019  4:00 PM   Number of days: 1       Wound 08/19/19 Toe (Comment  which one) Right right 4th toe   . 6 x .3 (Active)   [facility]%Wound Bed 100 8/19/2019  4:00 PM   Number of days: 1

## 2019-08-20 NOTE — PROGRESS NOTES
Progress Note  Admit Date: 2019      PCP: No primary care provider on file. CC: F/U for AMS    SUBJECTIVE / Interval History:  Awake, cooperative and calm         Allergies  Wellbutrin [bupropion] and Prednisone    Medications    Scheduled Meds:   magnesium sulfate  2 g Intravenous Once    sodium chloride flush  10 mL Intravenous 2 times per day    enoxaparin  40 mg Subcutaneous Daily    cefTRIAXone (ROCEPHIN) IV  1 g Intravenous Q24H     Continuous Infusions:      PRN Meds:  sodium phosphate IVPB **OR** sodium phosphate IVPB, LORazepam, sodium chloride flush, magnesium hydroxide, ondansetron, potassium chloride, magnesium sulfate, acetaminophen    Vitals    TEMPERATURE:  Current - Temp: 98 °F (36.7 °C); Max - Temp  Av.4 °F (36.9 °C)  Min: 98 °F (36.7 °C)  Max: 98.6 °F (37 °C)  RESPIRATIONS RANGE: Resp  Av.6  Min: 13  Max: 22  PULSE RANGE: Pulse  Av.9  Min: 61  Max: 84  BLOOD PRESSURE RANGE:  Systolic (63EVP), OMT:294 , Min:94 , NIY:457   ; Diastolic (94IZY), [place]:33, Min:31, Max:83    PULSE OXIMETRY RANGE: SpO2  Av.3 %  Min: 91 %  Max: 100 %  24HR INTAKE/OUTPUT:      Intake/Output Summary (Last 24 hours) at 2019 1015  Last data filed at 2019 0610  Gross per 24 hour   Intake 2827.75 ml   Output 540 ml   Net 2287.75 ml       Exam:    Gen: No distress. Eyes: PERRL. No sclera icterus. No conjunctival injection. ENT: No discharge. Pharynx clear. External appearance of ears and nose normal.  Neck: Trachea midline. No obvious mass. Resp: No accessory muscle use. No crackles. No wheezes. No rhonchi. No dullness on percussion. CV: Regular rate. Regular rhythm. No murmur or rub. No edema. GI: Non-tender. Non-distended. No hernia. Skin: Warm, dry, normal texture and turgor. No nodule on exposed extremities. Lymph: No cervical LAD. No supraclavicular LAD. M/S: No cyanosis. No clubbing. No joint deformity. Neuro: Moves all four extremities.  CN 2-12 tested, no defect transcription errors may be present.        Mimi Garcia MD

## 2021-07-12 ENCOUNTER — APPOINTMENT (OUTPATIENT)
Dept: CT IMAGING | Age: 31
End: 2021-07-12
Payer: MEDICAID

## 2021-07-12 ENCOUNTER — HOSPITAL ENCOUNTER (EMERGENCY)
Age: 31
Discharge: HOME | End: 2021-07-13
Attending: EMERGENCY MEDICINE
Payer: MEDICAID

## 2021-07-12 ENCOUNTER — APPOINTMENT (OUTPATIENT)
Dept: GENERAL RADIOLOGY | Age: 31
End: 2021-07-12
Payer: MEDICAID

## 2021-07-12 DIAGNOSIS — T50.901A ACCIDENTAL DRUG OVERDOSE, INITIAL ENCOUNTER: Primary | ICD-10-CM

## 2021-07-12 DIAGNOSIS — T42.4X1A ACCIDENTAL BENZODIAZEPINE POISONING, INITIAL ENCOUNTER: ICD-10-CM

## 2021-07-12 DIAGNOSIS — F12.10 MARIJUANA ABUSE: ICD-10-CM

## 2021-07-12 DIAGNOSIS — F15.10 METHAMPHETAMINE ABUSE (HCC): ICD-10-CM

## 2021-07-12 DIAGNOSIS — F14.10 NONDEPENDENT COCAINE ABUSE (HCC): ICD-10-CM

## 2021-07-12 LAB
ACETAMINOPHEN LEVEL: <5 UG/ML (ref 10–30)
ALBUMIN SERPL-MCNC: 4.6 G/DL (ref 3.4–5)
ALP BLD-CCNC: 97 U/L (ref 40–129)
ALT SERPL-CCNC: 22 U/L (ref 10–40)
ANION GAP SERPL CALCULATED.3IONS-SCNC: 11 MMOL/L (ref 3–16)
AST SERPL-CCNC: 23 U/L (ref 15–37)
BASOPHILS ABSOLUTE: 0 K/UL (ref 0–0.2)
BASOPHILS RELATIVE PERCENT: 0.5 %
BILIRUB SERPL-MCNC: 0.3 MG/DL (ref 0–1)
BILIRUBIN DIRECT: <0.2 MG/DL (ref 0–0.3)
BILIRUBIN, INDIRECT: NORMAL MG/DL (ref 0–1)
BUN BLDV-MCNC: 13 MG/DL (ref 7–20)
CALCIUM SERPL-MCNC: 9.5 MG/DL (ref 8.3–10.6)
CHLORIDE BLD-SCNC: 105 MMOL/L (ref 99–110)
CO2: 25 MMOL/L (ref 21–32)
CREAT SERPL-MCNC: 0.9 MG/DL (ref 0.9–1.3)
EOSINOPHILS ABSOLUTE: 0.1 K/UL (ref 0–0.6)
EOSINOPHILS RELATIVE PERCENT: 2 %
ETHANOL: NORMAL MG/DL (ref 0–0.08)
GFR AFRICAN AMERICAN: >60
GFR NON-AFRICAN AMERICAN: >60
GLUCOSE BLD-MCNC: 78 MG/DL (ref 70–99)
HCT VFR BLD CALC: 41 % (ref 40.5–52.5)
HEMOGLOBIN: 14.2 G/DL (ref 13.5–17.5)
LYMPHOCYTES ABSOLUTE: 2.8 K/UL (ref 1–5.1)
LYMPHOCYTES RELATIVE PERCENT: 41.7 %
MCH RBC QN AUTO: 28.6 PG (ref 26–34)
MCHC RBC AUTO-ENTMCNC: 34.6 G/DL (ref 31–36)
MCV RBC AUTO: 82.8 FL (ref 80–100)
MONOCYTES ABSOLUTE: 0.5 K/UL (ref 0–1.3)
MONOCYTES RELATIVE PERCENT: 7.6 %
NEUTROPHILS ABSOLUTE: 3.3 K/UL (ref 1.7–7.7)
NEUTROPHILS RELATIVE PERCENT: 48.2 %
PDW BLD-RTO: 14.2 % (ref 12.4–15.4)
PLATELET # BLD: 276 K/UL (ref 135–450)
PMV BLD AUTO: 7.7 FL (ref 5–10.5)
POTASSIUM REFLEX MAGNESIUM: 4 MMOL/L (ref 3.5–5.1)
RBC # BLD: 4.96 M/UL (ref 4.2–5.9)
SALICYLATE, SERUM: <0.3 MG/DL (ref 15–30)
SODIUM BLD-SCNC: 141 MMOL/L (ref 136–145)
TOTAL CK: 169 U/L (ref 39–308)
TOTAL PROTEIN: 7.5 G/DL (ref 6.4–8.2)
WBC # BLD: 6.7 K/UL (ref 4–11)

## 2021-07-12 PROCEDURE — 85025 COMPLETE CBC W/AUTO DIFF WBC: CPT

## 2021-07-12 PROCEDURE — 36415 COLL VENOUS BLD VENIPUNCTURE: CPT

## 2021-07-12 PROCEDURE — 71045 X-RAY EXAM CHEST 1 VIEW: CPT

## 2021-07-12 PROCEDURE — 80076 HEPATIC FUNCTION PANEL: CPT

## 2021-07-12 PROCEDURE — 99283 EMERGENCY DEPT VISIT LOW MDM: CPT

## 2021-07-12 PROCEDURE — 82550 ASSAY OF CK (CPK): CPT

## 2021-07-12 PROCEDURE — 80048 BASIC METABOLIC PNL TOTAL CA: CPT

## 2021-07-12 PROCEDURE — 80143 DRUG ASSAY ACETAMINOPHEN: CPT

## 2021-07-12 PROCEDURE — 2580000003 HC RX 258: Performed by: EMERGENCY MEDICINE

## 2021-07-12 PROCEDURE — 70450 CT HEAD/BRAIN W/O DYE: CPT

## 2021-07-12 PROCEDURE — 80179 DRUG ASSAY SALICYLATE: CPT

## 2021-07-12 PROCEDURE — 82077 ASSAY SPEC XCP UR&BREATH IA: CPT

## 2021-07-12 PROCEDURE — 93005 ELECTROCARDIOGRAM TRACING: CPT | Performed by: EMERGENCY MEDICINE

## 2021-07-12 RX ORDER — 0.9 % SODIUM CHLORIDE 0.9 %
1000 INTRAVENOUS SOLUTION INTRAVENOUS ONCE
Status: COMPLETED | OUTPATIENT
Start: 2021-07-12 | End: 2021-07-13

## 2021-07-12 RX ADMIN — SODIUM CHLORIDE 1000 ML: 9 INJECTION, SOLUTION INTRAVENOUS at 23:00

## 2021-07-12 ASSESSMENT — PAIN SCALES - GENERAL: PAINLEVEL_OUTOF10: 0

## 2021-07-13 VITALS
TEMPERATURE: 97.9 F | OXYGEN SATURATION: 100 % | DIASTOLIC BLOOD PRESSURE: 56 MMHG | HEART RATE: 56 BPM | SYSTOLIC BLOOD PRESSURE: 94 MMHG | RESPIRATION RATE: 17 BRPM

## 2021-07-13 LAB
AMPHETAMINE SCREEN, URINE: POSITIVE
BARBITURATE SCREEN URINE: ABNORMAL
BENZODIAZEPINE SCREEN, URINE: POSITIVE
CANNABINOID SCREEN URINE: POSITIVE
COCAINE METABOLITE SCREEN URINE: POSITIVE
EKG ATRIAL RATE: 62 BPM
EKG DIAGNOSIS: NORMAL
EKG P AXIS: 51 DEGREES
EKG P-R INTERVAL: 150 MS
EKG Q-T INTERVAL: 410 MS
EKG QRS DURATION: 100 MS
EKG QTC CALCULATION (BAZETT): 416 MS
EKG R AXIS: 56 DEGREES
EKG T AXIS: 45 DEGREES
EKG VENTRICULAR RATE: 62 BPM
Lab: ABNORMAL
METHADONE SCREEN, URINE: ABNORMAL
OPIATE SCREEN URINE: ABNORMAL
OXYCODONE URINE: ABNORMAL
PH UA: 6
PHENCYCLIDINE SCREEN URINE: ABNORMAL
PROPOXYPHENE SCREEN: ABNORMAL

## 2021-07-13 PROCEDURE — 80307 DRUG TEST PRSMV CHEM ANLYZR: CPT

## 2021-07-13 PROCEDURE — 93010 ELECTROCARDIOGRAM REPORT: CPT | Performed by: INTERNAL MEDICINE

## 2021-07-13 NOTE — ED NOTES
Handoff report given to Era Noland RN to assume care. Denies further questions.      Chay Nguyen RN  07/13/21 3415

## 2021-07-13 NOTE — ED PROVIDER NOTES
[address] [facility]      Pt Name: Yogi Preciado  MRN: [id number]  [place] 1990  Date of evaluation: 7/12/2021  Provider: Cary Yi MD    CHIEF COMPLAINT       Chief Complaint   Patient presents with    Drug Overdose     Pt in via EMS after being \"found unresponsive in car\". EMS reports giving \"4 mg narcan nasally with little response\". Pt presents to ED arousable but lethargic. HISTORY OF PRESENT ILLNESS    Yogi Preciado is a 31 y.o. male who presents to the emergency department with drug overdose. Patient was found in his car unresponsive. Was given Narcan without response. On arrival patient is minimally responsive. History otherwise limited. Nursing Notes were reviewed. Including nursing noted for FM, Surgical History, Past Medical History, Social History, vitals, and allergies; agree with all. REVIEW OF SYSTEMS       Review of Systems   Unable to perform ROS: Mental status change     PAST MEDICAL HISTORY     Past Medical History:   Diagnosis Date    ADHD (attention deficit hyperactivity disorder)        SURGICAL HISTORY     No past surgical history on file. CURRENT MEDICATIONS       Previous Medications    AMPHETAMINE-DEXTROAMPHETAMINE (ADDERALL, 20MG,) 20 MG TABLET    Take 1 tablet by mouth 2 times daily       ALLERGIES     Wellbutrin [bupropion] and Prednisone    FAMILY HISTORY      No family history on file.     SOCIAL HISTORY       Social History     Socioeconomic History    Marital status: Single     Spouse name: Not on file    Number of children: Not on file    Years of education: Not on file    Highest education level: Not on file   Occupational History    Not on file   Tobacco Use    Smoking status: Current Every Day Smoker     Packs/day: 0.50    Smokeless tobacco: Never Used   Substance and Sexual Activity    Alcohol use: No     Alcohol/week: 0.0 standard drinks     Comment: occ    Drug use: Yes     Types: Opiates      Comment: Pt reported snorted heroin for the first time in 6 mt (12/3/17)    Sexual activity: Not on file   Other Topics Concern    Not on file   Social History Narrative    Not on file     Social Determinants of Health     Financial Resource Strain:     Difficulty of Paying Living Expenses:    Food Insecurity:     Worried About Running Out of Food in the Last Year:     [address] in the Last Year:    Transportation Needs:     Lack of Transportation (Medical):  Lack of Transportation (Non-Medical):    Physical Activity:     Days of Exercise per Week:     Minutes of Exercise per Session:    Stress:     Feeling of Stress :    Social Connections:     Frequency of Communication with Friends and Family:     Frequency of Social Gatherings with Friends and Family:     Attends Muslim Services:     Active Member of Clubs or Organizations:     Attends Club or Organization Meetings:     Marital Status:    Intimate Partner Violence:     Fear of Current or Ex-Partner:     Emotionally Abused:     Physically Abused:     Sexually Abused:        PHYSICAL EXAM       ED Triage Vitals [07/12/21 2215]   BP Temp Temp Source Pulse Resp SpO2 Height Weight   108/68 97.9 °F (36.6 °C) Oral 74 23 99 % -- --       Physical Exam  Vitals and nursing note reviewed. Constitutional:       General: He is not in acute distress. Appearance: He is well-developed. He is not diaphoretic. HENT:      Head: Normocephalic and atraumatic. Eyes:      General:         Right eye: No discharge. Left eye: No discharge. Pupils: Pupils are equal, round, and reactive to light. Neck:      Thyroid: No thyromegaly. Vascular: No carotid bruit. Trachea: No tracheal deviation. Cardiovascular:      Rate and Rhythm: Regular rhythm. Bradycardia present. Heart sounds: No murmur heard. Pulmonary:      Breath sounds: No wheezing or rales.    Chest:      Chest wall: No tenderness. Abdominal:      General: There is no distension. Palpations: Abdomen is soft. There is no mass. Tenderness: There is no abdominal tenderness. There is no guarding or rebound. Musculoskeletal:         General: No tenderness or deformity. Cervical back: No rigidity or tenderness. Lymphadenopathy:      Cervical: No cervical adenopathy. Skin:     General: Skin is warm. Coloration: Skin is not pale. Findings: No erythema or rash. Neurological:      Motor: No abnormal muscle tone.       Comments: Minimally responsive         DIAGNOSTIC RESULTS     EKG shows sinus bradycardia rate fifty-two no signs of blocks or ectopy    RADIOLOGY:   Non-plain film images such as CT, Ultrasoundand MRI are read by the radiologist. [employer] radiographic images are visualized and preliminarily interpreted by the emergency physician with the below findings:    Imaging reassuring    ED BEDSIDE ULTRASOUND:   Performed by ED Physician - none    LABS:  Labs Reviewed   SALICYLATE LEVEL - Abnormal; Notable for the following components:       Result Value    Salicylate, Serum <3.8 (*)     All other components within normal limits    Narrative:     Performed at:  [address] [address]   Phone [phone]   ACETAMINOPHEN LEVEL - Abnormal; Notable for the following components:    Acetaminophen Level <5 (*)     All other components within normal limits    Narrative:     Performed at:  [facility]  [address] [facility] [address]   Phone [phone]   [address] - Abnormal; Notable for the following components:    Amphetamine Screen, Urine POSITIVE (*)     Benzodiazepine Screen, Urine POSITIVE (*)     Cannabinoid Scrn, Ur POSITIVE (*)     Cocaine Metabolite Screen, Urine POSITIVE (*)     All other components within normal limits    Narrative:     Performed at:  [facility]  [address] [address] [address]   Phone [phone]   CBC WITH AUTO DIFFERENTIAL    Narrative:     Performed at:  [facility]  [address] [address] [address]   Phone [phone]   BASIC METABOLIC PANEL W/ REFLEX TO MG FOR LOW K    Narrative:     Performed at:  [facility]  [address] [facility] [address] [place] [address]   Phone [phone]   HEPATIC FUNCTION PANEL    Narrative:     Performed at:  [facility]  [address] [facility] [address] [place] [address]   Phone [phone]   ETHANOL    Narrative:     Performed at:  [facility]  [address] [facility] [place] [address]   Phone [phone]   CK    Narrative:     Performed at:  [facility]  [address] [facility] [address] [place] [address]   Phone [phone]       All other labs were withinnormal range or not returned as of this dictation. EMERGENCY DEPARTMENT COURSE and DIFFERENTIAL DIAGNOSIS/MDM:     PMH, Surgical Hx, FH, Social Hx reviewed by myself (ETOH usage, Tobacco usage, Drug usage reviewed by myself, no pertinent Hx)- No Pertinent Hx     Old records were reviewed by me    69-year-old male with drug overdose. Urine drug screen shows marijuana, cocaine, methamphetamine, benzos. Patient was observed in the emergency room for over 7 hours until sobriety. Ambulates with steady gait on discharge. Answers all my questions appropriately on discharge. He denies suicidal or homicidal ideation. He states he was just trying to get high. Discussed how drugs can kill you. Patient verbalized understanding and states he has no intention of quitting. Ride provided. Patient discharged in stable condition with normal mental status and normal ambulation and no signs of distress.     I estimate there is LOW risk for Sepsis, MI, Stroke, Tamponade, PTX, Toxicity or other life threatening etiology thus I consider the discharge disposition reasonable. The patient is at low risk for mortality based on demographic, history and clinical factors. Given the best available information and clinical assessment, I estimate the risk of hospitalization to be greater than risk of treatment at home. I have explained to the patient that the risk could rapidly change, given precautions for return and instructions. Explained to patient that the risk for mortality is low based on demographic, history and clinical factors. I discussed with patient the results of evaluation in the ED, diagnosis, care, and prognosis. The plan is to discharge to home. Patient is in agreement with plan and questions have been answered. I also discussed with patient the reasons which may require a return visit and the importance of follow-up care. The patient is well-appearing, nontoxic, and improved at the time of discharge. Patient agrees to call to arrange follow-up care as directed. Patient understands to return immediately for worsening/change in symptoms. CRITICAL CARE TIME   Total Critical Caretime was 21 minutes, excluding separately reportable procedures. There was a high probability of clinically significant/life threatening deterioration in the patient's condition which required my urgent intervention. PROCEDURES:  Unlessotherwise noted below, none    FINAL IMPRESSION      1. Accidental drug overdose, initial encounter    2. Nondependent cocaine abuse ([address]    3. Methamphetamine abuse ([address]    4. Marijuana abuse    5.  Accidental benzodiazepine poisoning, initial encounter          DISPOSITION/PLAN   DISPOSITION Decision To Discharge 07/13/2021 03:25:56 AM    PATIENT REFERRED TO:  [facility] AT [address]  [phone]    Call today        DISCHARGE MEDICATIONS:  New Prescriptions    No medications on file          (Please note that portions ofthis note were completed with a voice recognition program.  Efforts were made to edit the dictations but occasionally words are mis-transcribed.)    Neil Mcburney, MD(electronically signed)  Attending Emergency Physician            Neil Mcburney, MD  07/13/21 9306

## 2021-07-13 NOTE — ED NOTES
Bed: B-09  Expected date:   Expected time:   Means of arrival:   Comments:  Leandra aguero     [facility] Staff, RN  07/12/21 6659

## 2021-12-26 ENCOUNTER — HOSPITAL ENCOUNTER (EMERGENCY)
Age: 31
Discharge: LEFT BEFORE DISCHARGE | End: 2021-12-26
Attending: EMERGENCY MEDICINE
Payer: MEDICAID

## 2021-12-26 VITALS
BODY MASS INDEX: 22.64 KG/M2 | TEMPERATURE: 97.7 F | OXYGEN SATURATION: 98 % | RESPIRATION RATE: 16 BRPM | DIASTOLIC BLOOD PRESSURE: 61 MMHG | HEIGHT: 69 IN | HEART RATE: 68 BPM | SYSTOLIC BLOOD PRESSURE: 109 MMHG | WEIGHT: 152.9 LBS

## 2021-12-26 DIAGNOSIS — L02.413 ABSCESS OF RIGHT FOREARM: Primary | ICD-10-CM

## 2021-12-26 DIAGNOSIS — F19.10 IV DRUG ABUSE (HCC): ICD-10-CM

## 2021-12-26 PROCEDURE — 99283 EMERGENCY DEPT VISIT LOW MDM: CPT

## 2021-12-26 RX ORDER — CLINDAMYCIN HYDROCHLORIDE 300 MG/1
300 CAPSULE ORAL 3 TIMES DAILY
Qty: 30 CAPSULE | Refills: 0 | Status: SHIPPED | OUTPATIENT
Start: 2021-12-26 | End: 2021-12-27

## 2021-12-26 ASSESSMENT — PAIN DESCRIPTION - ORIENTATION: ORIENTATION: RIGHT

## 2021-12-26 ASSESSMENT — PAIN DESCRIPTION - DESCRIPTORS: DESCRIPTORS: SHARP

## 2021-12-26 ASSESSMENT — PAIN DESCRIPTION - LOCATION: LOCATION: ARM

## 2021-12-26 ASSESSMENT — PAIN DESCRIPTION - PAIN TYPE: TYPE: ACUTE PAIN

## 2021-12-26 ASSESSMENT — PAIN SCALES - GENERAL: PAINLEVEL_OUTOF10: 7

## 2021-12-27 ENCOUNTER — HOSPITAL ENCOUNTER (EMERGENCY)
Age: 31
Discharge: HOME | End: 2021-12-27
Attending: EMERGENCY MEDICINE
Payer: MEDICAID

## 2021-12-27 VITALS
BODY MASS INDEX: 24.64 KG/M2 | DIASTOLIC BLOOD PRESSURE: 72 MMHG | RESPIRATION RATE: 16 BRPM | HEIGHT: 67 IN | TEMPERATURE: 98.7 F | OXYGEN SATURATION: 98 % | HEART RATE: 72 BPM | WEIGHT: 156.97 LBS | SYSTOLIC BLOOD PRESSURE: 123 MMHG

## 2021-12-27 DIAGNOSIS — L02.413 ABSCESS OF RIGHT FOREARM: Primary | ICD-10-CM

## 2021-12-27 PROCEDURE — 99283 EMERGENCY DEPT VISIT LOW MDM: CPT

## 2021-12-27 RX ORDER — CLINDAMYCIN HYDROCHLORIDE 150 MG/1
300 CAPSULE ORAL ONCE
Status: DISCONTINUED | OUTPATIENT
Start: 2021-12-27 | End: 2021-12-27 | Stop reason: HOSPADM

## 2021-12-27 ASSESSMENT — PAIN DESCRIPTION - DESCRIPTORS: DESCRIPTORS: THROBBING

## 2021-12-27 ASSESSMENT — PAIN - FUNCTIONAL ASSESSMENT: PAIN_FUNCTIONAL_ASSESSMENT: PREVENTS OR INTERFERES SOME ACTIVE ACTIVITIES AND ADLS

## 2021-12-27 ASSESSMENT — PAIN SCALES - GENERAL: PAINLEVEL_OUTOF10: 10

## 2021-12-27 ASSESSMENT — PAIN DESCRIPTION - ORIENTATION: ORIENTATION: RIGHT

## 2021-12-27 ASSESSMENT — PAIN DESCRIPTION - ONSET: ONSET: ON-GOING

## 2021-12-27 ASSESSMENT — PAIN DESCRIPTION - PAIN TYPE: TYPE: ACUTE PAIN

## 2021-12-27 ASSESSMENT — PAIN DESCRIPTION - LOCATION: LOCATION: ARM

## 2021-12-27 NOTE — ED NOTES
Painful swollen red area to top of right forearm for 1 week. Pain at 7. Pt states he is homeless.         Lawrence Manuel RN  12/26/21 1958

## 2021-12-27 NOTE — ED NOTES
Multiple attempts to awaken and perform I & d procedure unsuccessful. Pt uncooperative again. Security made aware.       Sim Louie RN  12/27/21 8961

## 2021-12-27 NOTE — ED PROVIDER NOTES
109/61   Pulse 68   Temp 97.7 °F (36.5 °C) (Oral)   Resp 16   Ht 5' 9\" (1.753 m)   Wt 152 lb 14.4 oz (69.4 kg)   SpO2 98%   BMI 22.58 kg/m²   Constitutional: Well-developed, well-nourished, appears normal, nontoxic, activity: Resting comfortably on the cart, no obvious pain, speaking full sentences. Does not appear ill or toxic. He is demanding and belligerent. Skin: Warm, Dry, No erythema, No rash. no Lacerations, no Abrasions. Back: No tenderness, Full range of motion, No scoliosis. Extremities:  neurovascular intact, no deformity, moderate dorsal mid right forearm swelling measuring 7 cm in diameter. The fluctuance measures 5 cm in diameter. Moderate erythema, no lacerations noted, no amputations, no cyanosis, no mottling, no ecchymosis, moderate tenderness of dorsal mid right forearm, capillary refill less than 2 seconds. Musculoskeletal: Good range of motion in all other major joints except those described above. Neurologic: Alert & oriented x 3, Normal motor function, Normal sensory function, No focal deficits noted. Psychiatric: Anxious, Judgment normal, Mood normal, no confusion. LABORATORY  Labs Reviewed - No data to display      RADIOLOGY/PROCEDURES  I personally reviewed the images for this case. No orders to display       [address]:    12/26/21 1933   BP: 109/61   Pulse: 68   Resp: 16   Temp: 97.7 °F (36.5 °C)   TempSrc: Oral   SpO2: 98%   Weight: 152 lb 14.4 oz (69.4 kg)   Height: 5' 9\" (1.753 m)       Medications - No data to display    New Prescriptions    CLINDAMYCIN (CLEOCIN) 300 MG CAPSULE    Take 1 capsule by mouth 3 times daily for 10 days         SEP-1 CORE MEASURE DATA  Exclusion criteria: the patient is NOT to be included for sepsis due to:  SIRS criteria are not met    Patient remained stable in the ED. patient elected to have the abscess drained.   After numbing the Of the abscess with lidocaine patient became angry and belligerent

## 2021-12-27 NOTE — ED NOTES
Security accompanied to pt's room to awaken pt. Pt finally awakens and walks out of ER on his own.      Nava Schroeder RN  12/27/21 6732

## 2021-12-27 NOTE — ED NOTES
12/26/21 2200 pt didn't get rx for antibiotic. Has not returned to ED for rx. Called pt and left message that his AVS, rx, and st [address] voucher would be here for 24 hours if he wanted to pick it up.        Jackie Thorne RN  12/27/21 4578

## 2021-12-27 NOTE — ED PROVIDER NOTES
[address] [facility] [employer]      Pt Name: Mandeep Cr  MRN: [id number]  [place] 1990  Date of evaluation: 12/27/2021  Provider: Yumiko Cruz Dr  Chief Complaint   Patient presents with    Abscess       I wore personal protective equipment when I was in the room the entire time. This includes gloves, N95 mask, face shield, and a glove over my stethoscope for protection. HPI  Mandeep Cr is a 27 y.o. male who presents with abscess of his right forearm. He uses IV drugs. He has had abscesses before. He continues to to use IV drugs. He has been on 1 rehab program which apparently was not successful. He denies any fevers or chills. [place] any nausea vomiting. Nothing makes it better or worse. He describes it as severe. He was here earlier today and signed out [address] because it hurt when I injected lidocaine intradermally. He was later brought in by EMS who brought him to this facility and he was angry because they did not take him to [facility].  He denies any radiation of his pain. He describes it as sharp and severe. ? REVIEW OF SYSTEMS  All systems negative except as noted in the HPI. Reviewed Nurses' notes and concur. No LMP for male patient. PAST MEDICAL HISTORY  Past Medical History:   Diagnosis Date    Abscess     ADHD (attention deficit hyperactivity disorder)     Drug abuse ([address]        FAMILY HISTORY  No family history on file. SOCIAL HISTORY   reports that he has been smoking. He has been smoking about 0.50 packs per day. He has never used smokeless tobacco. He reports current drug use. Drugs: Marijuana Pipe Blow), Cocaine, Opiates , and Methamphetamines (Crystal Meth). He reports that he does not drink alcohol. SURGICAL HISTORY  No past surgical history on file.     CURRENT MEDICATIONS      ALLERGIES  Allergies   Allergen Reactions    Bupropion      seizure  Other reaction(s): Unknown  seizure  seizure  seizure    Prednisone Rash       Tetanus vaccination status reviewed: last tetanus booster within 10 years. PHYSICAL EXAM  VITAL SIGNS: /72   Pulse 72   Temp 98.7 °F (37.1 °C) (Oral)   Resp 16   Ht 5' 7\" (1.702 m)   Wt 156 lb 15.5 oz (71.2 kg)   SpO2 98%   BMI 24.58 kg/m²   Constitutional: Well-developed, well-nourished, appears normal, nontoxic, activity: Vital signs are normal.  He is not meet sepsis criteria. His temperature is normal.  Heart rate is 72 and his respiratory rate is 16. I went to evaluate the patient at [address] [place] [address] and he was sleeping. He would not wake up or cooperate to have his abscess drained. Skin: Warm, Dry, moderate dorsal right forearm erythema measuring 7 cm in diameter, no rash, moderate dorsal left forearm swelling measuring 5 cm in diameter, moderate dorsal right forearm tender, fluctuant abcess noted on dorsal right forearm as noted above. Back: No tenderness, Full range of motion, No scoliosis. Extremities: No edema, moderate dorsal right forearm tenderness, No cyanosis, No clubbing. No amputations, capillary refill less than 2 seconds. Musculoskeletal: No tenderness to palpation or major deformities noted. Neurologic: Alert & oriented x 3, Normal motor function, Normal sensory function, No focal motor deficits. Psychiatric: Affect normal, Mood normal.    COURSE & MEDICAL DECISION MAKING    Vitals:    12/27/21 0138   BP: 123/72   Pulse: 72   Resp: 16   Temp: 98.7 °F (37.1 °C)   TempSrc: Oral   SpO2: 98%   Weight: 156 lb 15.5 oz (71.2 kg)   Height: 5' 7\" (1.702 m)       Medications   clindamycin (CLEOCIN) capsule 300 mg (has no administration in time range)       New Prescriptions    No medications on file       SEP-1 CORE MEASURE DATA  Exclusion criteria: the patient is NOT to be included for sepsis due to:  SIRS criteria are not met    Patient remained stable in the ED.  at [phone] I went back to take care of the abscess and patient was still sleeping. I attempted to wake him up but he would not wake. I then was able to wake him up with shaking but he would not cooperate. He would not turn over to let me I&D his abscess. He is continue to sleep. I then woke him up again and patient became verbally abusive and threatening to this physician. Therefore, patient was discharged with IV antibiotics. I did not I&D the abscess because he was not cooperative. He was discharged with instruction to return to emergency department in 2 days for reevaluation. He was given his first dose of antibiotics in the emergency department. The patient's blood pressure was found to be elevated according to CMS/Medicare and the Affordable Care Act/[facility] criteria. Elevated blood pressure could occur because of pain or anxiety or other reasons and does not mean that they need to have their blood pressure treated or medications otherwise adjusted. However, this could also be a sign that they will need to have their blood pressure treated or medications changed. The patient was instructed to follow up closely with their personal physician to have their blood pressure rechecked. The patient was instructed to take a list of recent blood pressure readings to their next visit with their personal physician. See discharge instructions for specific medications, discharge information, and treatments. They were verbally instructed to return to emergency if any problems. (This chart has been completed using [address]. Although attempts have been made to ensure accuracy, words and/or phrases may not be transcribed as intended.)    Patient refused pain medicines at the time of their exam.    IMPRESSION(S):  1.  Abscess of right forearm          Recheck Times: 0215, 0325    Diagnostic considerations include but are not limited to:  Diagnostic considerations include but are not limited to:  Necrotizing fasciitis, deep space soft tissue bacterial skin infection, viral rash, systemic infectious rash, aseptic cyst, malignancy, other         Jas Lucy, DO  12/27/21 6443

## 2021-12-27 NOTE — ED NOTES
Pt no longer in room, in lobby , or outside ED doors. Left without receiving antibiotic rx.      Cortney Perez RN  12/26/21 2014

## 2021-12-27 NOTE — ED NOTES
[id number] EMD to bedside trying to do I&D of right forearm abscess. Pt stopped Dr Kennedy Arias while he was trying to anesthesize area. Pt refused any further treatment and says he is leaving. EMD left room and states pt is leaving AMA. This RN to bedside and trying to talk to pt. Pt tearful but still wants to leave. Explained AMA form and pt signed it. Brought pt several lg bandaids. Encouraged him to go into the bathroom and wash area with soap and water. EMD states he will give pt an antibiotic. Explained this to pt and pt agreeable to wait for prescription.      Ted Olivarez, CHAD  12/26/21 2008

## 2022-06-28 ENCOUNTER — HOSPITAL ENCOUNTER (EMERGENCY)
Age: 32
Discharge: HOME | End: 2022-06-28
Attending: STUDENT IN AN ORGANIZED HEALTH CARE EDUCATION/TRAINING PROGRAM
Payer: MEDICAID

## 2022-06-28 VITALS
TEMPERATURE: 98.3 F | OXYGEN SATURATION: 97 % | SYSTOLIC BLOOD PRESSURE: 132 MMHG | RESPIRATION RATE: 16 BRPM | BODY MASS INDEX: 24.94 KG/M2 | HEART RATE: 74 BPM | HEIGHT: 67 IN | WEIGHT: 158.9 LBS | DIASTOLIC BLOOD PRESSURE: 71 MMHG

## 2022-06-28 DIAGNOSIS — F19.90 DRUG USE: Primary | ICD-10-CM

## 2022-06-28 LAB
ACETAMINOPHEN LEVEL: <5 UG/ML (ref 10–30)
ANION GAP SERPL CALCULATED.3IONS-SCNC: 12 MMOL/L (ref 3–16)
BUN BLDV-MCNC: 24 MG/DL (ref 7–20)
CALCIUM SERPL-MCNC: 9.6 MG/DL (ref 8.3–10.6)
CHLORIDE BLD-SCNC: 103 MMOL/L (ref 99–110)
CO2: 27 MMOL/L (ref 21–32)
CREAT SERPL-MCNC: 1 MG/DL (ref 0.9–1.3)
EKG ATRIAL RATE: 103 BPM
EKG DIAGNOSIS: NORMAL
EKG P AXIS: 72 DEGREES
EKG P-R INTERVAL: 128 MS
EKG Q-T INTERVAL: 328 MS
EKG QRS DURATION: 84 MS
EKG QTC CALCULATION (BAZETT): 429 MS
EKG R AXIS: 72 DEGREES
EKG T AXIS: 70 DEGREES
EKG VENTRICULAR RATE: 103 BPM
GFR AFRICAN AMERICAN: >60
GFR NON-AFRICAN AMERICAN: >60
GLUCOSE BLD-MCNC: 97 MG/DL (ref 70–99)
POTASSIUM REFLEX MAGNESIUM: 4.7 MMOL/L (ref 3.5–5.1)
SALICYLATE, SERUM: <0.3 MG/DL (ref 15–30)
SODIUM BLD-SCNC: 142 MMOL/L (ref 136–145)
TOTAL CK: 81 U/L (ref 39–308)

## 2022-06-28 PROCEDURE — 82550 ASSAY OF CK (CPK): CPT

## 2022-06-28 PROCEDURE — 80179 DRUG ASSAY SALICYLATE: CPT

## 2022-06-28 PROCEDURE — 96360 HYDRATION IV INFUSION INIT: CPT

## 2022-06-28 PROCEDURE — 99284 EMERGENCY DEPT VISIT MOD MDM: CPT

## 2022-06-28 PROCEDURE — 80048 BASIC METABOLIC PNL TOTAL CA: CPT

## 2022-06-28 PROCEDURE — 80143 DRUG ASSAY ACETAMINOPHEN: CPT

## 2022-06-28 PROCEDURE — 2580000003 HC RX 258: Performed by: STUDENT IN AN ORGANIZED HEALTH CARE EDUCATION/TRAINING PROGRAM

## 2022-06-28 PROCEDURE — 93005 ELECTROCARDIOGRAM TRACING: CPT | Performed by: STUDENT IN AN ORGANIZED HEALTH CARE EDUCATION/TRAINING PROGRAM

## 2022-06-28 RX ORDER — DEXTROAMPHETAMINE SACCHARATE, AMPHETAMINE ASPARTATE, DEXTROAMPHETAMINE SULFATE AND AMPHETAMINE SULFATE 5; 5; 5; 5 MG/1; MG/1; MG/1; MG/1
20 TABLET ORAL DAILY
COMMUNITY

## 2022-06-28 RX ORDER — BUPRENORPHINE AND NALOXONE 8; 2 MG/1; MG/1
1 FILM, SOLUBLE BUCCAL; SUBLINGUAL DAILY
COMMUNITY

## 2022-06-28 RX ORDER — SODIUM CHLORIDE, SODIUM LACTATE, POTASSIUM CHLORIDE, AND CALCIUM CHLORIDE .6; .31; .03; .02 G/100ML; G/100ML; G/100ML; G/100ML
1000 INJECTION, SOLUTION INTRAVENOUS ONCE
Status: COMPLETED | OUTPATIENT
Start: 2022-06-28 | End: 2022-06-28

## 2022-06-28 RX ADMIN — SODIUM CHLORIDE, POTASSIUM CHLORIDE, SODIUM LACTATE AND CALCIUM CHLORIDE 1000 ML: 600; 310; 30; 20 INJECTION, SOLUTION INTRAVENOUS at 08:37

## 2022-06-28 ASSESSMENT — PAIN - FUNCTIONAL ASSESSMENT
PAIN_FUNCTIONAL_ASSESSMENT: NONE - DENIES PAIN
PAIN_FUNCTIONAL_ASSESSMENT: NONE - DENIES PAIN

## 2022-06-28 NOTE — ED PROVIDER NOTES
[address] [facility]          ATTENDING PHYSICIAN NOTE       Date of evaluation: 6/28/2022    Chief Complaint     Ingestion      History of Present Illness     Patti Bush is a 27 y.o. male who presents with substance use    Patient is brought in by police after they were called due to a domestic dispute. There is no report of any trauma or assault. The patient was found on the ground having sat down, but there was no report of fall. He denies fall or head injury to me. He is upset because he is going to half-way, as he relates to me, but he denies any SI or HI. He does endorse the use of a couple of Adderall, which he says is prescribed to him, as well as \"quarter of a xanax bar\" but these are both typical substances he uses for the purpose of feeling better. He would not be here if the police had not brought him. He denies any pain. His ROS is pan-negative as below  He has not taken lithium for many months, as it was discontinued by a psychiatrist.    PMHx: ADHD, asbcess, anxiety  SH: endorses substance use including meth, tobacco, and as below    Review of Systems       ROS:   Positive as per HPI. Negative for:    -Constitutional: fevers, chills    -Eyes:   eye pain, eye discharge    -Ears/Nose/Throat: Ear pain, ear discharge    -Cardiovascular: CP, cyanosis    -Respiratory:  cough, SOB    -Gastrointestinal: Abd pain, nausea, vomiting, melena, hematochezia    -Genitourinary: hematuria, dysuria, urinary frequency    -Neurological: numbness or weakness    -Skin:   Rash, pruritis,     -Hematologic: easy bleeding, easy bruising    -Musculoskeletal:  joint swelling, joint redness    Past Medical, Surgical, Family, and Social History     He has a past medical history of Abscess, ADHD (attention deficit hyperactivity disorder), and Drug abuse ([facility] [address] He has no past surgical history on file. His family history is not on file. He reports that he has been smoking.  He has been smoking about 0.50 packs per day. He has never used smokeless tobacco. He reports current drug use. Drugs: Marijuana [place]), Cocaine, Opiates , and Methamphetamines (Crystal Meth). He reports that he does not drink alcohol. Medications     Previous Medications    AMPHETAMINE-DEXTROAMPHETAMINE (ADDERALL) 20 MG TABLET    Take 20 mg by mouth daily. BUPRENORPHINE-NALOXONE (SUBOXONE) 8-2 MG FILM SL FILM    Place 1 Film under the tongue daily. Allergies     He is allergic to bupropion and prednisone. Physical Exam     INITIAL VITALS: BP: (!) 149/117, Temp: 98.6 °F (37 °C), Heart Rate: (!) 108, Resp: 20, SpO2: 98 %     General:  Well appearing. No acute distress. Non-toxic appearing    HEENT: Head atraumatic, no Hunt's sign or Raccoon eyes, pupils equal round and reactive to light, EOMI, sclera clear, no facial tenderness to palpation or step offs, no midface instability, no hemotympanum bilaterally, mucus membranes moist, no trismus, no jaw malocclusion, oropharynx WNL, no septal hematoma  MMM    Neck:  Supple. Full ROM without pain    Pulmonary:   Non-labored breathing. Breath sounds clear bilaterally. Cardiac:  Regular rate and rhythm. No murmurs. Abdomen:  Soft. Non-tender. Non-distended. No masses. Musculoskeletal: No obvious deformities, no tenderness to palpation, no midline C, T or L spine tenderness to palpation, full neck ROM without pain, full ROM in all extremities with no pain    Vascular:  Extremities warm and perfused. Radial pulses 2+ bilaterally. Dorsalis pedis pulses 2+ bilaterally. Skin:  No rash. Warm. Neuro: GCS15, AAOx4. CN 2-12 intact. Normal gait. Sensation intact. Strength grossly equal and symmetric. Extremities:  No peripheral edema. LE symmetric.           Diagnostic Results     EKG   Indication substance use      EKG Interpretation     Interpreted by me (emergency department physician)     Rhythm:  sinus tachycardia  Rate: 103  Axis: normal (72)  Ectopy: none  Conduction: normal  ST Segments: no acute change  T Waves: no acute change  Q Waves: nonspecific pattern     Clinical Impression:   Sinus tachycardia. This is a non-specific EKG with no significant change compared to the prior EKG dated 7/2021. There is no evidence of significant interval prolongation. There is no evidence of acute ischemia. No terminal R wave in aVR  Mild baseline wander seen in first 2 seconds but adequate evaluation in last 1 sec  [facility]    RADIOLOGY:  No orders to display       LABS:   Results for orders placed or performed during the hospital encounter of 04/52/20   Basic Metabolic Panel w/ Reflex to MG   Result Value Ref Range    Sodium 142 136 - 145 mmol/L    Potassium reflex Magnesium 4.7 3.5 - 5.1 mmol/L    Chloride 103 99 - 110 mmol/L    CO2 27 21 - 32 mmol/L    Anion Gap 12 3 - 16    Glucose 97 70 - 99 mg/dL    BUN 24 (H) 7 - 20 mg/dL    CREATININE 1.0 0.9 - 1.3 mg/dL    GFR Non-African American >60 >60    GFR African American >60 >60    Calcium 9.6 8.3 - 10.6 mg/dL   CK   Result Value Ref Range    Total CK 81 39 - 308 U/L   Acetaminophen Level   Result Value Ref Range    Acetaminophen Level <5 (L) 10 - 30 ug/mL   Salicylate   Result Value Ref Range    Salicylate, Serum <0.3 (L) 15.0 - 30.0 mg/dL   EKG 12 Lead   Result Value Ref Range    Ventricular Rate 103 BPM    Atrial Rate 103 BPM    P-R Interval 128 ms    QRS Duration 84 ms    Q-T Interval 328 ms    QTc Calculation (Bazett) 429 ms    P Axis 72 degrees    R Axis 72 degrees    T Axis 70 degrees    Diagnosis       EKG performed in ER and to be interpreted by ER physician. Confirmed by MD, ER ([id number]),  [address] [facility], [address] [place] [address] ([id number]) on 6/28/2022 7:32:55 AM       ED BEDSIDE ULTRASOUND:  None performed    Procedures     None performed    ED Course     Nursing Notes, Past Medical Hx, Past Surgical Hx, Social Hx, Allergies, and Family Hx were reviewed.     The patient was given the following medications:  Orders Placed This Encounter   Medications    lactated ringers bolus       CONSULTS:  None    MEDICAL DECISIONMAKING / ASSESSMENT / PLAN     Samuel Vargas is a 27 y.o. male with substance use. Pt was hemodynamically stable and afebrile in the Emergency Department. Pt's examination is reassuring. He has no external signs of significant trauma. He denies any SI or HI. His affect and mood are congruent, his range is typical, and he explains his situation appropriately. I do not have concern for intentional self-harm. However, he has been using street-acquired drugs. Given street-acquired drug use, I also obtained APAP and salicylate levels which were both undetectable. For this reason, I obtained a screening EKG which had no abnormal intervals  He was seen yesterday with concern for meth use at [facility], so I obtanied a BMP and CK which were reassuring against rhabdo or SOFÍA or electrolyte abnormality. The patient's mild tachycardia improved with minimal fluid resuscitation. On reassessment, he feels improved. He has been monitored here in the ED for several hours and is clearly clinically sober. He is tolerating PO intake. He will be discharged with return precautions. Clinical Impression     1.  Drug use          Disposition     PATIENT REFERRED TO:  [facility] Emergency Department  [address] [place] [address]  [phone]  Schedule an appointment as soon as possible for a visit in 2 days  As needed, For re-evaluation      DISCHARGE MEDICATIONS:  New Prescriptions    No medications on file       DISPOSITION Decision To Discharge 06/28/2022 09:42:15 AM       Chandler Demarco MD  06/28/22 9908